# Patient Record
Sex: FEMALE | Race: BLACK OR AFRICAN AMERICAN | Employment: FULL TIME | RURAL
[De-identification: names, ages, dates, MRNs, and addresses within clinical notes are randomized per-mention and may not be internally consistent; named-entity substitution may affect disease eponyms.]

---

## 2017-09-12 ENCOUNTER — OFFICE VISIT (OUTPATIENT)
Dept: FAMILY MEDICINE CLINIC | Age: 32
End: 2017-09-12

## 2017-09-12 VITALS
WEIGHT: 199.2 LBS | RESPIRATION RATE: 20 BRPM | TEMPERATURE: 98.9 F | HEIGHT: 61 IN | BODY MASS INDEX: 37.61 KG/M2 | SYSTOLIC BLOOD PRESSURE: 148 MMHG | HEART RATE: 98 BPM | DIASTOLIC BLOOD PRESSURE: 104 MMHG | OXYGEN SATURATION: 99 %

## 2017-09-12 DIAGNOSIS — R53.83 FATIGUE, UNSPECIFIED TYPE: ICD-10-CM

## 2017-09-12 DIAGNOSIS — R00.2 HEART PALPITATIONS: ICD-10-CM

## 2017-09-12 DIAGNOSIS — G44.209 ACUTE NON INTRACTABLE TENSION-TYPE HEADACHE: Primary | ICD-10-CM

## 2017-09-12 RX ORDER — POLYETHYLENE GLYCOL 3350 17 G/17G
17 POWDER, FOR SOLUTION ORAL DAILY
Qty: 288 G | Refills: 0 | Status: SHIPPED | OUTPATIENT
Start: 2017-09-12 | End: 2018-01-01

## 2017-09-12 NOTE — PROGRESS NOTES
I reviewed with the resident the medical history and the resident's findings on the physical examination. I discussed with the resident the patient's diagnosis and concur with the plan. Repeat BP still elevated. If BP elevated on recheck in 3 days will start medication. EKG personally reviewed along with Dr. Inga Bledsoe, show sinus rhythm.

## 2017-09-12 NOTE — PROGRESS NOTES
[address] [facility],    Maribell Darling 303 with [facility] and [employer]            HPI     CC: I am having extreme headaches\"     Reyes Pimentel is a 27 y.o. female who presents     For the past 3 weeks, pt has had a throbbing frontal headache chest palpitations, along with hotflashes , fatigue, poor appetite, poor sleep. Pt has consistently been working for the past  28/30 days. Pt works as a  at [employer], looks at screens all day. Pt has some family stressors. She reports smoking more over the last month ( 3cigs to 10 cigs) after quitting marijuana 2 months after 20 years. .She states the headache is present when she wakes up ( rating at a level 4 )  and worsens as she gets closer to work ( rating at its peak 7/10) Today the pain is a 4/10. Pt has never had this happen before. Pain improves with Tylenol however, pt has only taken tylenol on 1-2 occasions due to not wanting to take medications    Yesterday, pt was seen at [facility] for headaches, at that time 120 bp and was told she had elevated blood pressure. Chest palpitations   Exercises 3-4 times a week. Constipation  Pt usually goes once every day. However,has bowel movements every  2-3 days. Patient's last menstrual period was 08/22/2017. This period last 3 days. Typical period last 5 days. PMHx:  History reviewed. No pertinent past medical history. Meds:   Current Outpatient Prescriptions   Medication Sig Dispense Refill    ketoconazole (NIZORAL) 2 % topical cream Apply  to affected area daily. 15 g 2       Allergies: Allergies   Allergen Reactions    Sulfur Itching       Smoker:  History   Smoking Status    Current Some Day Smoker   Smokeless Tobacco    Never Used       ETOH:   History   Alcohol Use No       FH:   History reviewed. No pertinent family history. ROS:  Review of Systems   Constitutional: Positive for fatigue.  Negative for activity change, appetite change, chills and fever. HENT: Negative for congestion, facial swelling, sinus pain, sinus pressure, sore throat and trouble swallowing. Respiratory: Negative for chest tightness and shortness of breath. Cardiovascular: Positive for palpitations. Negative for leg swelling. Gastrointestinal: Negative for abdominal pain, diarrhea, nausea and vomiting. Genitourinary: Positive for frequency. Negative for decreased urine volume, dysuria, flank pain and hematuria. Musculoskeletal: Negative for back pain and joint swelling. Neurological: Positive for headaches. Negative for dizziness, tremors, weakness, light-headedness and numbness. Psychiatric/Behavioral: Negative for confusion. Physical Exam:  Visit Vitals    BP (!) 141/101    Pulse 98    Temp 98.9 °F (37.2 °C) (Oral)    Resp 20    Ht 5' 1\" (1.549 m)    Wt 199 lb 3.2 oz (90.4 kg)    LMP 08/22/2017    SpO2 99%    BMI 37.64 kg/m2     PHQ9 :   Wt Readings from Last 3 Encounters:   09/12/17 199 lb 3.2 oz (90.4 kg)   06/17/15 190 lb 3.2 oz (86.3 kg)     BP Readings from Last 3 Encounters:   09/12/17 (!) 141/101   06/17/15 130/83        Physical Exam   Constitutional: She is oriented to person, place, and time. She appears well-developed and well-nourished. HENT:   Head: Atraumatic. Eyes: Conjunctivae and EOM are normal.   Neck: Neck supple. Cardiovascular: Normal rate, regular rhythm, normal heart sounds and intact distal pulses. Pulmonary/Chest: Effort normal and breath sounds normal. No respiratory distress. Abdominal: Bowel sounds are normal.   Musculoskeletal: Normal range of motion. She exhibits no edema. Neurological: She is alert and oriented to person, place, and time. No cranial nerve deficit. Skin: Skin is warm. No erythema. Psychiatric: She has a normal mood and affect. Assessment     27 y.o. female with history of tobacco abuse presents with:   There is no problem list on file for this patient. Today's diagnoses are:    ICD-10-CM ICD-9-CM    1. Acute non intractable tension-type headache G44.209 339.10 LIPID PANEL      METABOLIC PANEL, COMPREHENSIVE   2. Fatigue, unspecified type R53.83 780.79 TSH 3RD GENERATION      CBC W/O DIFF      HEMOGLOBIN A1C WITH EAG   3. Heart palpitations R00.2 785.1 REFERRAL TO CARDIOLOGY      AMB POC EKG ROUTINE W/ 12 LEADS, INTER & REP              Plan     1. Frontal Headaches without aura  Pt has photophobia, nausea symptoms are consistent with Migraines however it seems pt has underlying hypertension. Headaches improve with tylenol. She has associated fatigue, poor appetite,and constipation No neurological deficits on exam .   - Repeat blood pressure   - Encourage hydration > 8 cups daily   - Scheduled Tylenol every 6 hours   - Refer to Optometry     2. HTN in the setting in of persistent headaches, fatigue , poor appetite, worsening of  tobacco abuse. - Will recheck BP today   - CBC  - TSH   - CMP   - Lipid panel   - Hemoglobin A1C  - Referral to Cardiology for stress test   - EKG     3. Constipation   - Miralax  Daily   - Increase fiber in diet   - Increase water intake     Morbid Obesity   I have reviewed/discussed the above normal BMI with the patient. I have recommended the following interventions: dietary management education, guidance, and counseling, encourage exercise, monitor weight and prescribed dietary intake . Naregina Broccoli Tobacco Abuse   - Pt is working on quitting  The patient was counseled on the dangers of tobacco use, and was advised to quit. Reviewed strategies to maximize success, including removing cigarettes and smoking materials from environment, stress management, substitution of other forms of reinforcement and support of family/friends. 4. Discussed: as noted above     5. Follow up in 3 days for blood pressure check and symptoms. After not working for a couple of days. Prior labs and imaging were reviewed.        I have discussed the diagnosis with the patient and the intended plan as seen in the above orders. The patient has received an after-visit summary and questions were answered concerning future plans. I have discussed medication side effects and warnings with the patient as well. Patient was seen and discussed with Dr. Tere Gonzales, Attending Physician.     Eli Barrientos MD    [address] [place] [address]

## 2017-09-12 NOTE — PROGRESS NOTES
Health Maintenance Due   Topic Date Due    Pneumococcal 19-64 Medium Risk (1 of 1 - PPSV23) 01/25/2002    DTaP/Tdap/Td series (1 - Tdap) 01/25/2004    PAP AKA CERVICAL CYTOLOGY  01/25/2004    INFLUENZA AGE 9 TO ADULT  08/01/2017

## 2017-09-12 NOTE — MR AVS SNAPSHOT
Visit Information Date & Time Provider Department Dept. Phone Encounter #  
 9/12/2017  9:10 AM Ember Matos MD [address] [place], [place] [phone] [id number] Follow-up Instructions Return for bp check and reevaluation . Upcoming Health Maintenance Date Due Pneumococcal 19-64 Medium Risk (1 of 1 - PPSV23) 1/25/2002 DTaP/Tdap/Td series (1 - Tdap) 1/25/2004 PAP AKA CERVICAL CYTOLOGY 1/25/2004 INFLUENZA AGE 9 TO ADULT 8/1/2017 Allergies as of 9/12/2017  Review Complete On: 9/12/2017 By: Helio Reich Severity Noted Reaction Type Reactions Sulfur  06/17/2015    Itching Current Immunizations  Never Reviewed No immunizations on file. Not reviewed this visit You Were Diagnosed With   
  
 Codes Comments Acute non intractable tension-type headache    -  Primary ICD-10-CM: B95.740 ICD-9-CM: 339.10 Fatigue, unspecified type     ICD-10-CM: R53.83 ICD-9-CM: 780.79 Heart palpitations     ICD-10-CM: R00.2 ICD-9-CM: 785.1 Vitals BP Pulse Temp Resp Height(growth percentile) Weight(growth percentile) (!) 141/101 98 98.9 °F (37.2 °C) (Oral) 20 5' 1\" (1.549 m) 199 lb 3.2 oz (90.4 kg) LMP SpO2 BMI OB Status Smoking Status 08/22/2017 99% 37.64 kg/m2 Having regular periods Current Some Day Smoker Vitals History BMI and BSA Data Body Mass Index Body Surface Area  
 37.64 kg/m 2 1.97 m 2 Preferred Pharmacy Pharmacy Name Phone [facility] 79 [phone] Your Updated Medication List  
  
   
This list is accurate as of: 9/12/17 10:03 AM.  Always use your most recent med list.  
  
  
  
  
 ketoconazole 2 % topical cream  
Commonly known as:  Michele Mater Apply  to affected area daily. polyethylene glycol 17 gram/dose powder Commonly known as:  Luis Carlos Dao Take 17 g by mouth daily. Prescriptions Sent to Pharmacy Refills  
 polyethylene glycol (MIRALAX) 17 gram/dose powder 0 Sig: Take 17 g by mouth daily. Class: Normal  
 Pharmacy: [address] #: [phone] Route: Oral  
  
We Performed the Following AMB POC EKG ROUTINE W/ 12 LEADS, INTER & REP [75140 CPT(R)] CBC W/O DIFF [46395 CPT(R)] HEMOGLOBIN A1C WITH EAG [46242 CPT(R)] LIPID PANEL [53230 CPT(R)] METABOLIC PANEL, COMPREHENSIVE [03855 CPT(R)] REFERRAL TO CARDIOLOGY [DTU77 Custom] Comments:  
 Please evaluate patient for stress test.  
 REFERRAL TO OPTOMETRY [ZIR15 Custom] Comments:  
 Please evaluate patient for vision and acuity changes . T4, FREE [id number] CPT(R)] TSH 3RD GENERATION [56497 CPT(R)] Follow-up Instructions Return for bp check and reevaluation . Referral Information Referral ID Referred By Referred To  
  
 [id number] Chris Lyon Not Available Visits Status Start Date End Date 1 New Request 9/12/17 9/12/18 If your referral has a status of pending review or denied, additional information will be sent to support the outcome of this decision. Referral ID Referred By Referred To  
 [id number] Chris Lyon Not Available Visits Status Start Date End Date 1 New Request 9/12/17 9/12/18 If your referral has a status of pending review or denied, additional information will be sent to support the outcome of this decision. Introducing [facility]! Neha Cleveland introduces [employer] patient portal. Now you can access parts of your medical record, email your doctor's office, and request medication refills online. 1. In your internet browser, go to [email or web address] 2. Click on the First Time User? Click Here link in the Sign In box. You will see the New Member Sign Up page. 3. Enter your [employer] Access Code exactly as it appears below.  You will not need to use this code after youve completed the sign-up process. If you do not sign up before the expiration date, you must request a new code. · OnDeck Access Code: [email or web address] Expires: 12/11/2017  9:24 AM 
 
4. Enter the last four digits of your Social Security Number (xxxx) and Date of Birth (mm/dd/yyyy) as indicated and click Submit. You will be taken to the next sign-up page. 5. Create a OnDeck ID. This will be your OnDeck login ID and cannot be changed, so think of one that is secure and easy to remember. 6. Create a OnDeck password. You can change your password at any time. 7. Enter your Password Reset Question and Answer. This can be used at a later time if you forget your password. 8. Enter your e-mail address. You will receive e-mail notification when new information is available in [address] 9. Click Sign Up. You can now view and download portions of your medical record. 10. Click the Download Summary menu link to download a portable copy of your medical information. If you have questions, please visit the Frequently Asked Questions section of the OnDeck website. Remember, OnDeck is NOT to be used for urgent needs. For medical emergencies, dial 911. Now available from your iPhone and Android! Please provide this summary of care documentation to your next provider. Your primary care clinician is listed as Amanda Flood. If you have any questions after today's visit, please call [phone].

## 2017-09-12 NOTE — LETTER
NOTIFICATION RETURN TO WORK / SCHOOL 
 
9/12/2017 10:08 AM 
 
Ms. Inge Wu [address] [address] To Whom It May Concern: 
 
Kiesha Thomas is currently under the care of Deana Treadwell. She will return to work on: Monday September 18, 2017 If there are questions or concerns please have the patient contact our office.  
 
 
 
Sincerely, 
 
 
Dustin Wade MD

## 2017-09-13 LAB
ALBUMIN SERPL-MCNC: 4 G/DL (ref 3.5–5.5)
ALBUMIN/GLOB SERPL: 1.4 {RATIO} (ref 1.2–2.2)
ALP SERPL-CCNC: 70 IU/L (ref 39–117)
ALT SERPL-CCNC: 14 IU/L (ref 0–32)
AST SERPL-CCNC: 12 IU/L (ref 0–40)
BILIRUB SERPL-MCNC: 0.2 MG/DL (ref 0–1.2)
BUN SERPL-MCNC: 9 MG/DL (ref 6–20)
BUN/CREAT SERPL: 11 (ref 9–23)
CALCIUM SERPL-MCNC: 8.9 MG/DL (ref 8.7–10.2)
CHLORIDE SERPL-SCNC: 105 MMOL/L (ref 96–106)
CHOLEST SERPL-MCNC: 117 MG/DL (ref 100–199)
CO2 SERPL-SCNC: 22 MMOL/L (ref 18–29)
CREAT SERPL-MCNC: 0.83 MG/DL (ref 0.57–1)
ERYTHROCYTE [DISTWIDTH] IN BLOOD BY AUTOMATED COUNT: 17.2 % (ref 12.3–15.4)
EST. AVERAGE GLUCOSE BLD GHB EST-MCNC: 108 MG/DL
GLOBULIN SER CALC-MCNC: 2.9 G/DL (ref 1.5–4.5)
GLUCOSE SERPL-MCNC: 87 MG/DL (ref 65–99)
HBA1C MFR BLD: 5.4 % (ref 4.8–5.6)
HCT VFR BLD AUTO: 39.8 % (ref 34–46.6)
HDLC SERPL-MCNC: 37 MG/DL
HGB BLD-MCNC: 12.8 G/DL (ref 11.1–15.9)
LDLC SERPL CALC-MCNC: 60 MG/DL (ref 0–99)
MCH RBC QN AUTO: 22.9 PG (ref 26.6–33)
MCHC RBC AUTO-ENTMCNC: 32.2 G/DL (ref 31.5–35.7)
MCV RBC AUTO: 71 FL (ref 79–97)
PLATELET # BLD AUTO: 334 X10E3/UL (ref 150–379)
POTASSIUM SERPL-SCNC: 4.9 MMOL/L (ref 3.5–5.2)
PROT SERPL-MCNC: 6.9 G/DL (ref 6–8.5)
RBC # BLD AUTO: 5.6 X10E6/UL (ref 3.77–5.28)
SODIUM SERPL-SCNC: 140 MMOL/L (ref 134–144)
T4 FREE SERPL-MCNC: 0.95 NG/DL (ref 0.82–1.77)
TRIGL SERPL-MCNC: 101 MG/DL (ref 0–149)
TSH SERPL DL<=0.005 MIU/L-ACNC: 1.29 UIU/ML (ref 0.45–4.5)
VLDLC SERPL CALC-MCNC: 20 MG/DL (ref 5–40)
WBC # BLD AUTO: 7.8 X10E3/UL (ref 3.4–10.8)

## 2017-09-14 ENCOUNTER — OFFICE VISIT (OUTPATIENT)
Dept: FAMILY MEDICINE CLINIC | Age: 32
End: 2017-09-14

## 2017-09-14 VITALS
RESPIRATION RATE: 20 BRPM | SYSTOLIC BLOOD PRESSURE: 124 MMHG | OXYGEN SATURATION: 97 % | TEMPERATURE: 98.6 F | HEIGHT: 61 IN | DIASTOLIC BLOOD PRESSURE: 93 MMHG | BODY MASS INDEX: 36.93 KG/M2 | WEIGHT: 195.6 LBS | HEART RATE: 97 BPM

## 2017-09-14 DIAGNOSIS — I10 ESSENTIAL HYPERTENSION: Primary | ICD-10-CM

## 2017-09-14 RX ORDER — METOPROLOL SUCCINATE 25 MG/1
50 TABLET, EXTENDED RELEASE ORAL DAILY
Qty: 30 TAB | Refills: 1 | Status: SHIPPED | OUTPATIENT
Start: 2017-09-14 | End: 2018-01-01

## 2017-09-14 RX ORDER — HYDROCHLOROTHIAZIDE 12.5 MG/1
12.5 TABLET ORAL DAILY
Qty: 30 TAB | Refills: 1 | Status: SHIPPED | OUTPATIENT
Start: 2017-09-14 | End: 2017-09-14

## 2017-09-14 NOTE — PROGRESS NOTES
[address] [facility],    Vishalmonserrat Lisset 303 with [place] and Nichol Poole            [facility] [place]     CC: \" I am feeling better\"    Chikis Pelaez is a 27 y.o. female who presents blood pressure check     HTN   Pt reports since last visit 2 days ago, she has changed eating habits. Found another job with less stress. Pt works out at baseline. Pt has gotten more rest. Pt still smokes at least 3-4 cigs a day, but denies smoking marijuana, illicit drug use or tobacco use. Denies headaches, vision changes,fevers, chills, nausea, vomiting , chest pain, dyspnea abdominal pain,constipation, melena, hematochezia, lower extremity swelling. PMHx:  History reviewed. No pertinent past medical history. Meds:   Current Outpatient Prescriptions   Medication Sig Dispense Refill    metoprolol succinate (TOPROL-XL) 25 mg XL tablet Take 2 Tabs by mouth daily. 30 Tab 1    polyethylene glycol (MIRALAX) 17 gram/dose powder Take 17 g by mouth daily. 288 g 0    ketoconazole (NIZORAL) 2 % topical cream Apply  to affected area daily. 15 g 2       Allergies: Allergies   Allergen Reactions    Sulfur Itching       Smoker:  History   Smoking Status    Current Some Day Smoker   Smokeless Tobacco    Never Used       ETOH:   History   Alcohol Use No       FH:   History reviewed. No pertinent family history. ROS:  Review of Systems   Constitutional: Negative for activity change, appetite change, chills, fatigue and fever. Respiratory: Negative for chest tightness and shortness of breath. Cardiovascular: Negative for chest pain, palpitations and leg swelling. Gastrointestinal: Negative for abdominal pain, diarrhea, nausea and vomiting. Genitourinary: Negative for dysuria, flank pain and hematuria. Musculoskeletal: Negative for back pain and joint swelling. Neurological: Negative for dizziness, weakness and numbness. Psychiatric/Behavioral: Negative for confusion.        Physical Exam:  Visit Vitals    BP (!) 124/93 (BP 1 Location: Right arm, BP Patient Position: Sitting)    Pulse 97    Temp 98.6 °F (37 °C) (Oral)    Resp 20    Ht 5' 1\" (1.549 m)    Wt 195 lb 9.6 oz (88.7 kg)    LMP 08/22/2017    SpO2 97%    BMI 36.96 kg/m2       Wt Readings from Last 3 Encounters:   09/14/17 195 lb 9.6 oz (88.7 kg)   09/12/17 199 lb 3.2 oz (90.4 kg)   06/17/15 190 lb 3.2 oz (86.3 kg)     BP Readings from Last 3 Encounters:   09/14/17 (!) 124/93   09/12/17 (!) 148/104   06/17/15 130/83        Physical Exam   Constitutional: She is oriented to person, place, and time. She appears well-developed and well-nourished. HENT:   Head: Atraumatic. Eyes: Conjunctivae and EOM are normal.   Neck: Neck supple. Cardiovascular: Normal rate, regular rhythm, normal heart sounds and intact distal pulses. Pulmonary/Chest: Effort normal and breath sounds normal. No respiratory distress. Abdominal: Bowel sounds are normal.   Musculoskeletal: Normal range of motion. She exhibits no edema. Neurological: She is alert and oriented to person, place, and time. No cranial nerve deficit. Skin: Skin is warm. No erythema. Psychiatric: She has a normal mood and affect. Assessment     27 y.o. female presents with history of: There is no problem list on file for this patient. Today's diagnoses are:    ICD-10-CM ICD-9-CM    1. Essential hypertension I10 401.9 metoprolol succinate (TOPROL-XL) 25 mg XL tablet      DISCONTINUED: hydroCHLOROthiazide (HYDRODIURIL) 12.5 mg tablet              Plan     1. HTN   Lipid panel wnl on 9/12/17,TSH 1.290, T4 0.95. Pt is tacycardic 110 in the office  - Will start Metoprolol 50mg   - previously drawn labs were discussed    2. Obesity   . I have reviewed/discussed the above normal BMI with the patient.   I have recommended the following interventions: dietary management education, guidance, and counseling, encourage exercise, monitor weight and prescribed dietary intake . Hugo Andres Follow-up Disposition:  Return in about 4 weeks (around 10/12/2017) for routine follow up ,bp check . Prior labs and imaging were reviewed. I have discussed the diagnosis with the patient and the intended plan as seen in the above orders. The patient has received an after-visit summary and questions were answered concerning future plans. I have discussed medication side effects and warnings with the patient as well. Patient discussed with Dr. Ana Vaughn , Attending Physician.     Amanda Flood MD    [address] [place] [address]

## 2017-09-14 NOTE — MR AVS SNAPSHOT
Visit Information Date & Time Provider Department Dept. Phone Encounter #  
 9/14/2017  3:10 PM Yvette Campo  [facility] [phone] [id number] Follow-up Instructions Return in about 4 weeks (around 10/12/2017) for routine follow up ,bp check . Your Appointments 10/11/2017  3:00 PM  
New Patient with May Pardo MD  
[facility] ([facility]) Appt Note: palpitations [address]  
  
   
 [address] [place] [address] Upcoming Health Maintenance Date Due Pneumococcal 19-64 Medium Risk (1 of 1 - PPSV23) 1/25/2002 DTaP/Tdap/Td series (1 - Tdap) 1/25/2004 PAP AKA CERVICAL CYTOLOGY 1/25/2004 INFLUENZA AGE 9 TO ADULT 8/1/2017 Allergies as of 9/14/2017  Review Complete On: 9/14/2017 By: Marty Villagomez Severity Noted Reaction Type Reactions Sulfur  06/17/2015    Itching Current Immunizations  Never Reviewed No immunizations on file. Not reviewed this visit You Were Diagnosed With   
  
 Codes Comments Essential hypertension    -  Primary ICD-10-CM: I10 
ICD-9-CM: 401.9 Vitals BP Pulse Temp Resp Height(growth percentile) Weight(growth percentile) (!) 124/93 (BP 1 Location: Right arm, BP Patient Position: Sitting) 97 98.6 °F (37 °C) (Oral) 20 5' 1\" (1.549 m) 195 lb 9.6 oz (88.7 kg) LMP SpO2 BMI OB Status Smoking Status 08/22/2017 97% 36.96 kg/m2 Having regular periods Current Some Day Smoker Vitals History BMI and BSA Data Body Mass Index Body Surface Area  
 36.96 kg/m 2 1.95 m 2 Preferred Pharmacy Pharmacy Name Phone [facility] [address] [phone] Your Updated Medication List  
  
   
This list is accurate as of: 9/14/17  3:38 PM.  Always use your most recent med list.  
  
  
  
  
 ketoconazole 2 % topical cream  
Commonly known as:  NIZORAL Apply  to affected area daily. metoprolol succinate 25 mg XL tablet Commonly known as:  TOPROL-XL Take 2 Tabs by mouth daily. polyethylene glycol 17 gram/dose powder Commonly known as:  Jelani Anderson Take 17 g by mouth daily. Prescriptions Sent to Pharmacy Refills  
 metoprolol succinate (TOPROL-XL) 25 mg XL tablet 1 Sig: Take 2 Tabs by mouth daily. Class: Normal  
 Pharmacy: [address], [address] [place] [address] #: [phone] Route: Oral  
  
Follow-up Instructions Return in about 4 weeks (around 10/12/2017) for routine follow up ,bp check . Introducing [facility]! Rosa M Salinas introduces [employer] patient portal. Now you can access parts of your medical record, email your doctor's office, and request medication refills online. 1. In your internet browser, go to [email or web address] 2. Click on the First Time User? Click Here link in the Sign In box. You will see the New Member Sign Up page. 3. Enter your [employer] Access Code exactly as it appears below. You will not need to use this code after youve completed the sign-up process. If you do not sign up before the expiration date, you must request a new code. · [employer] Access Code: [email or web address] Expires: 12/11/2017  9:24 AM 
 
4. Enter the last four digits of your Social Security Number (xxxx) and Date of Birth (mm/dd/yyyy) as indicated and click Submit. You will be taken to the next sign-up page. 5. Create a [employer] ID. This will be your [employer] login ID and cannot be changed, so think of one that is secure and easy to remember. 6. Create a [employer] password. You can change your password at any time. 7. Enter your Password Reset Question and Answer. This can be used at a later time if you forget your password. 8. Enter your e-mail address. You will receive e-mail notification when new information is available in [address] 9. Click Sign Up. You can now view and download portions of your medical record. 10. Click the Download Summary menu link to download a portable copy of your medical information. If you have questions, please visit the Frequently Asked Questions section of the [employer] website. Remember, [employer] is NOT to be used for urgent needs. For medical emergencies, dial 911. Now available from your iPhone and Android! Please provide this summary of care documentation to your next provider. Your primary care clinician is listed as Eli Barrientos. If you have any questions after today's visit, please call [phone].

## 2017-09-19 NOTE — PROGRESS NOTES
I discussed the findings, assessment and plan in detail with the resident and agree with the resident's findings and plan as documented in the resident's note. Shanelle Huynh M.D.

## 2017-10-11 ENCOUNTER — OFFICE VISIT (OUTPATIENT)
Dept: CARDIOLOGY CLINIC | Age: 32
End: 2017-10-11

## 2017-10-11 VITALS
SYSTOLIC BLOOD PRESSURE: 132 MMHG | DIASTOLIC BLOOD PRESSURE: 88 MMHG | RESPIRATION RATE: 16 BRPM | OXYGEN SATURATION: 97 % | HEART RATE: 88 BPM | HEIGHT: 61 IN | BODY MASS INDEX: 37 KG/M2 | TEMPERATURE: 98.3 F | WEIGHT: 196 LBS

## 2017-10-11 DIAGNOSIS — I10 ESSENTIAL HYPERTENSION: ICD-10-CM

## 2017-10-11 DIAGNOSIS — R00.0 TACHYCARDIA: Primary | ICD-10-CM

## 2017-10-11 NOTE — PATIENT INSTRUCTIONS

## 2017-10-11 NOTE — LETTER
NOTIFICATION RETURN TO WORK  
 
10/11/2017 3:28 PM 
 
Ms. Anmol De La Cruz 
[address] [facility] [address] To Whom It May Concern: 
 
Anmol De La Cruz is currently under the care of [address] She will return to work on: 10-12-17 If there are questions or concerns please have the patient contact our office. Sincerely, Lani Huerta LPN

## 2017-10-11 NOTE — PROGRESS NOTES
Celeste Elliott      1985   Mary Jo Jones MD  Date of Visit-10/11/2017     [facility],  PCP=Megan Gautam MD     [facility] [address] [place] [address] [facility] [place]. HPI:   Nella Ward is a 27 y.o. female    Ms. Nichole Jordan is referred for tachycardia. She's had a little bit of elevated blood pressure. She admits to eating a fair amount of fast food. She works as a  at a [employer] and feels she has been busy and stressed out. Her blood pressure is elevated in the high 140s. Dr. Julia Gautam started a beta blocker and since she has felt better and symptoms of her fatigue and shortness of breath have improved. She's had no chest pain, chest pressure. She had a heart rate of 110 and EKG was done on previous visit that showed normal sinus rhythm. Assessment/Plans:  1. Tachycardia    2. Essential hypertension         1. Tachycardia and blood pressure have resolved with the use of beta blocker. This was not an arrhythmia, but rather a sinus tachycardia that was seen on the EKG, likely related to the elevated blood pressure, and I've encouraged her to decrease her salt, avoid fast foods, continue current medications. No further cardiac testing is required. Prior SH/FH/PMH/ROS/MEDS were reviewed in the chart. No future appointments. Cardiac History:   No specialty comments available. ROS:Cardiac complete  as above. Respiratory as above with no wheezing or hemoptysis. She denies  symptoms of unusual weight loss , fevers,  BRBPR, hematuria,  or recent stroke    History reviewed. No pertinent past medical history.    Exam and Labs:  Visit Vitals    /88 (BP 1 Location: Right arm, BP Patient Position: Sitting)    Pulse 88    Temp 98.3 °F (36.8 °C) (Oral)    Resp 16    Ht 5' 1\" (1.549 m)    Wt 196 lb (88.9 kg)    LMP 08/22/2017    SpO2 97%    BMI 37.03 kg/m2     Constitutional:  NAD, comfortable , moist mucous membranesHENT: Head: NC,ATEyes: No scleral icterus. Neck:  Neck supple. No JVD present. No tracheal deviation,mass  Chest: Effort normal & normal respiratory excursion     Lungs:breath sounds normal. No stridor. distress, wheezes or  Rales. Heart:normal rate, regular rhythm, normal S1, S2, no murmurs, rubs, clicks or gallops , PMI non displaced. Edema: Edema is none. Extremities:  no clubbing or cyanosis. Abdominal:  no abnormal distension. Neurological: alert, conversant and oriented . Skin: Skin is not cold. No obvious systemic rash noted. Not diaphoretic. No erythema. Psychiatric:  Grossly normal mood and affect. Behavior appears normal.     Lab Results   Component Value Date/Time    Cholesterol, total 117 09/12/2017 10:33 AM    HDL Cholesterol 37 09/12/2017 10:33 AM    LDL, calculated 60 09/12/2017 10:33 AM    Triglyceride 101 09/12/2017 10:33 AM     Lab Results   Component Value Date/Time    Sodium 140 09/12/2017 10:33 AM    Potassium 4.9 09/12/2017 10:33 AM    Chloride 105 09/12/2017 10:33 AM    CO2 22 09/12/2017 10:33 AM    Glucose 87 09/12/2017 10:33 AM    BUN 9 09/12/2017 10:33 AM    Creatinine 0.83 09/12/2017 10:33 AM    BUN/Creatinine ratio 11 09/12/2017 10:33 AM    GFR est  09/12/2017 10:33 AM    GFR est non-AA 92 09/12/2017 10:33 AM    Calcium 8.9 09/12/2017 10:33 AM      Wt Readings from Last 3 Encounters:   10/11/17 196 lb (88.9 kg)   09/14/17 195 lb 9.6 oz (88.7 kg)   09/12/17 199 lb 3.2 oz (90.4 kg)      BP Readings from Last 3 Encounters:   10/11/17 132/88   09/14/17 (!) 124/93   09/12/17 (!) 148/104        Current Outpatient Prescriptions   Medication Sig    metoprolol succinate (TOPROL-XL) 25 mg XL tablet Take 2 Tabs by mouth daily.  polyethylene glycol (MIRALAX) 17 gram/dose powder Take 17 g by mouth daily.  ketoconazole (NIZORAL) 2 % topical cream Apply  to affected area daily. No current facility-administered medications for this visit.        Past Surgical History: Procedure Laterality Date    HX TONSIL AND ADENOIDECTOMY        Social Hx=  reports that she has been smoking. She has never used smokeless tobacco. She reports that she does not drink alcohol. Family Hx= family history is not on file. Impression see above.

## 2017-10-11 NOTE — PROGRESS NOTES
1. Have you been to the ER, urgent care clinic since your last visit? Hospitalized since your last visit? No    2. Have you seen or consulted any other health care providers outside of the [address] [place] [address] since your last visit? Include any pap smears or colon screening.  No  Reviewed record in preparation for visit and have necessary documentation  Pt did not bring medication to office visit for review    Goals that were addressed and/or need to be completed during or after this appointment include   Health Maintenance Due   Topic Date Due    Pneumococcal 19-64 Medium Risk (1 of 1 - PPSV23) 01/25/2002    DTaP/Tdap/Td series (1 - Tdap) 01/25/2004    PAP AKA CERVICAL CYTOLOGY  01/25/2004    INFLUENZA AGE 9 TO ADULT  08/01/2017

## 2018-01-01 ENCOUNTER — OFFICE VISIT (OUTPATIENT)
Dept: FAMILY MEDICINE CLINIC | Age: 33
End: 2018-01-01

## 2018-01-01 VITALS
HEIGHT: 61 IN | TEMPERATURE: 98.1 F | OXYGEN SATURATION: 98 % | HEART RATE: 99 BPM | BODY MASS INDEX: 34.93 KG/M2 | SYSTOLIC BLOOD PRESSURE: 132 MMHG | DIASTOLIC BLOOD PRESSURE: 88 MMHG | WEIGHT: 185 LBS | RESPIRATION RATE: 20 BRPM

## 2018-01-01 VITALS
OXYGEN SATURATION: 97 % | WEIGHT: 163 LBS | HEART RATE: 102 BPM | DIASTOLIC BLOOD PRESSURE: 98 MMHG | BODY MASS INDEX: 30.78 KG/M2 | TEMPERATURE: 98.4 F | RESPIRATION RATE: 16 BRPM | SYSTOLIC BLOOD PRESSURE: 142 MMHG | HEIGHT: 61 IN

## 2018-01-01 DIAGNOSIS — S46.912A SHOULDER STRAIN, LEFT, INITIAL ENCOUNTER: Primary | ICD-10-CM

## 2018-01-01 DIAGNOSIS — R03.0 ELEVATED BLOOD PRESSURE READING: ICD-10-CM

## 2018-01-01 DIAGNOSIS — B35.4 TINEA CORPORIS: ICD-10-CM

## 2018-01-01 DIAGNOSIS — R21 RASH: Primary | ICD-10-CM

## 2018-01-01 RX ORDER — CYCLOBENZAPRINE HCL 10 MG
5 TABLET ORAL
Qty: 30 TAB | Refills: 1 | Status: SHIPPED | OUTPATIENT
Start: 2018-01-01 | End: 2019-01-01

## 2018-01-01 RX ORDER — TRIAMCINOLONE ACETONIDE 5 MG/G
OINTMENT TOPICAL 2 TIMES DAILY
Qty: 30 G | Refills: 0 | Status: SHIPPED | OUTPATIENT
Start: 2018-01-01 | End: 2018-01-01

## 2018-01-01 RX ORDER — KETOCONAZOLE 20 MG/G
CREAM TOPICAL
Qty: 60 G | Refills: 2 | Status: SHIPPED | OUTPATIENT
Start: 2018-01-01 | End: 2018-01-01

## 2018-07-31 NOTE — PATIENT INSTRUCTIONS

## 2018-07-31 NOTE — PROGRESS NOTES
1. Have you been to the ER, urgent care clinic since your last visit? Hospitalized since your last visit? no 
 
2. Have you seen or consulted any other health care providers outside of the [address] [place] [address] since your last visit? Include any pap smears or colon screening. no 
Reviewed record in preparation for visit and have obtained necessary documentation. Patient did not bring medications to visit for review. Information provided on Advanced Directive, Living Will. Body mass index is 34.96 kg/(m^2). Health Maintenance Due Topic Date Due  Pneumococcal 19-64 Medium Risk (1 of 1 - PPSV23) 01/25/2002  DTaP/Tdap/Td series (1 - Tdap) 01/25/2004  PAP AKA CERVICAL CYTOLOGY  01/25/2004

## 2018-07-31 NOTE — MR AVS SNAPSHOT
[address] [address] 
[phone] Patient: Humphrey Ormond 
MRN: [id number] Visit Information Date & Time Provider Department Dept. Phone Encounter #  
 7/31/2018  9:50 AM Lindalee Angelucci, MD  [facility] [id number] Upcoming Health Maintenance Date Due Pneumococcal 19-64 Medium Risk (1 of 1 - PPSV23) 1/25/2002 DTaP/Tdap/Td series (1 - Tdap) 1/25/2004 PAP AKA CERVICAL CYTOLOGY 1/25/2004 Influenza Age 5 to Adult 8/1/2018 Allergies as of 7/31/2018  Review Complete On: 7/31/2018 By: Ofelia Wells LPN Severity Noted Reaction Type Reactions Sulfur  06/17/2015    Itching Current Immunizations  Never Reviewed No immunizations on file. Not reviewed this visit You Were Diagnosed With   
  
 Codes Comments Rash    -  Primary ICD-10-CM: R21 
ICD-9-CM: 782.1 Tinea corporis     ICD-10-CM: B35.4 ICD-9-CM: 110.5 Vitals BP Pulse Temp Resp Height(growth percentile) Weight(growth percentile) 132/88 (BP 1 Location: Left arm, BP Patient Position: Sitting) 99 98.1 °F (36.7 °C) (Oral) 20 5' 1\" (1.549 m) 185 lb (83.9 kg) SpO2 BMI OB Status Smoking Status 98% 34.96 kg/m2 Having regular periods Current Some Day Smoker Vitals History BMI and BSA Data Body Mass Index Body Surface Area 34.96 kg/m 2 1.9 m 2 Preferred Pharmacy Pharmacy Name Phone [facility] [address] [place] [address] [phone] Your Updated Medication List  
  
   
This list is accurate as of 7/31/18 10:44 AM.  Always use your most recent med list.  
  
  
  
  
 ketoconazole 2 % topical cream  
Commonly known as:  Lisa Fallen Apply to right arm twice daily for 14 days  
  
 metoprolol succinate 25 mg XL tablet Commonly known as:  TOPROL-XL Take 2 Tabs by mouth daily. polyethylene glycol 17 gram/dose powder Commonly known as:  Lemalvin Gutierrez Take 17 g by mouth daily. triamcinolone acetonide 0.5 % ointment Commonly known as:  KENALOG Apply  to affected area two (2) times a day. use thin layer Prescriptions Sent to Pharmacy Refills  
 triamcinolone acetonide (KENALOG) 0.5 % ointment 0 Sig: Apply  to affected area two (2) times a day. use thin layer Class: Normal  
 Pharmacy: [facility] [address] Ph #: [phone] Route: Topical  
 ketoconazole (NIZORAL) 2 % topical cream 2 Sig: Apply to right arm twice daily for 14 days Class: Normal  
 Pharmacy: [address] Ph #: [phone] Patient Instructions A Healthy Lifestyle: Care Instructions Your Care Instructions A healthy lifestyle can help you feel good, stay at a healthy weight, and have plenty of energy for both work and play. A healthy lifestyle is something you can share with your whole family. A healthy lifestyle also can lower your risk for serious health problems, such as high blood pressure, heart disease, and diabetes. You can follow a few steps listed below to improve your health and the health of your family. Follow-up care is a key part of your treatment and safety. Be sure to make and go to all appointments, and call your doctor if you are having problems. It's also a good idea to know your test results and keep a list of the medicines you take. How can you care for yourself at home? · Do not eat too much sugar, fat, or fast foods. You can still have dessert and treats now and then. The goal is moderation. · Start small to improve your eating habits. Pay attention to portion sizes, drink less juice and soda pop, and eat more fruits and vegetables. ¨ Eat a healthy amount of food.  A 3-ounce serving of meat, for example, is about the size of a deck of cards. Fill the rest of your plate with vegetables and whole grains. ¨ Limit the amount of soda and sports drinks you have every day. Drink more water when you are thirsty. ¨ Eat at least 5 servings of fruits and vegetables every day. It may seem like a lot, but it is not hard to reach this goal. A serving or helping is 1 piece of fruit, 1 cup of vegetables, or 2 cups of leafy, raw vegetables. Have an apple or some carrot sticks as an afternoon snack instead of a candy bar. Try to have fruits and/or vegetables at every meal. 
· Make exercise part of your daily routine. You may want to start with simple activities, such as walking, bicycling, or slow swimming. Try to be active 30 to 60 minutes every day. You do not need to do all 30 to 60 minutes all at once. For example, you can exercise 3 times a day for 10 or 20 minutes. Moderate exercise is safe for most people, but it is always a good idea to talk to your doctor before starting an exercise program. 
· Keep moving. Yamileth Folk the lawn, work in the garden, or [employer]. Take the stairs instead of the elevator at work. · If you smoke, quit. People who smoke have an increased risk for heart attack, stroke, cancer, and other lung illnesses. Quitting is hard, but there are ways to boost your chance of quitting tobacco for good. ¨ Use nicotine gum, patches, or lozenges. ¨ Ask your doctor about stop-smoking programs and medicines. ¨ Keep trying. In addition to reducing your risk of diseases in the future, you will notice some benefits soon after you stop using tobacco. If you have shortness of breath or asthma symptoms, they will likely get better within a few weeks after you quit. · Limit how much alcohol you drink. Moderate amounts of alcohol (up to 2 drinks a day for men, 1 drink a day for women) are okay. But drinking too much can lead to liver problems, high blood pressure, and other health problems. Family health If you have a family, there are many things you can do together to improve your health. · Eat meals together as a family as often as possible. · Eat healthy foods. This includes fruits, vegetables, lean meats and dairy, and whole grains. · Include your family in your fitness plan. Most people think of activities such as jogging or tennis as the way to fitness, but there are many ways you and your family can be more active. Anything that makes you breathe hard and gets your heart pumping is exercise. Here are some tips: 
¨ Walk to do errands or to take your child to school or the bus. ¨ Go for a family bike ride after dinner instead of watching TV. Where can you learn more? Go to http://elodia-shannen.info/. Enter H667 in the search box to learn more about \"A Healthy Lifestyle: Care Instructions. \" Current as of: December 7, 2017 Content Version: 11.7 © 0230-7048 [employer]. Care instructions adapted under license by [employer] (which disclaims liability or warranty for this information). If you have questions about a medical condition or this instruction, always ask your healthcare professional. [place] [address] any warranty or liability for your use of this information. Dont forget to buy - Antihistamines - Zantac - Zyrtec/claritin - Benadryl (at night) Introducing [facility]! [employer] introduces [employer]ia patient portal. Now you can access parts of your medical record, email your doctor's office, and request medication refills online. 1. In your internet browser, go to [email or web address] [email or web address] 2. Click on the First Time User? Click Here link in the Sign In box. You will see the New Member Sign Up page. 3. Enter your Abaxia Access Code exactly as it appears below. You will not need to use this code after youve completed the sign-up process.  If you do not sign up before the expiration date, you must request a new code. · [email or web address] Access Code: Elizabeth Esquivel Expires: 10/29/2018 10:44 AM 
 
4. Enter the last four digits of your Social Security Number (xxxx) and Date of Birth (mm/dd/yyyy) as indicated and click Submit. You will be taken to the next sign-up page. 5. Create a [email or web address] ID. This will be your [email or web address] login ID and cannot be changed, so think of one that is secure and easy to remember. 6. Create a [email or web address] password. You can change your password at any time. 7. Enter your Password Reset Question and Answer. This can be used at a later time if you forget your password. 8. Enter your e-mail address. You will receive e-mail notification when new information is available in [address] 9. Click Sign Up. You can now view and download portions of your medical record. 10. Click the Download Summary menu link to download a portable copy of your medical information. If you have questions, please visit the Frequently Asked Questions section of the [email or web address] website. Remember, [email or web address] is NOT to be used for urgent needs. For medical emergencies, dial 911. Now available from your iPhone and Android! Please provide this summary of care documentation to your next provider. Your primary care clinician is listed as Olivia Leiva. If you have any questions after today's visit, please call [phone].

## 2018-07-31 NOTE — PROGRESS NOTES
[address] [address] [facility], [address] [place] [address] Affiliated with [address] [place] [address] 
  
CC: \" Rash \"  
 
Ivania Rhodes is a 26 y.o. female who presents for worsening of left rash Pt reports for the past 2 months , she has noticed worsening of an itchy rash found on left elbow. She reports trying hydrocortisone cream consistently for a few weeks with some improvement,but stop using the cream and the rash progressed. Pt denies having rash like this before. Reports allergies to only sulfur containing substances. Denies symptoms of wheezes,throat swelling, lip swelling, difficulty breathing,recent bug bites or exposure to wooded areas/plants. Denies recent weight loss or night sweats. PMHx: 
History reviewed. No pertinent past medical history. Meds:  
Current Outpatient Prescriptions Medication Sig Dispense Refill  triamcinolone acetonide (KENALOG) 0.5 % ointment Apply  to affected area two (2) times a day. use thin layer 30 g 0  
 ketoconazole (NIZORAL) 2 % topical cream Apply to right arm twice daily for 14 days 60 g 2  
 metoprolol succinate (TOPROL-XL) 25 mg XL tablet Take 2 Tabs by mouth daily. 30 Tab 1  polyethylene glycol (MIRALAX) 17 gram/dose powder Take 17 g by mouth daily. 288 g 0 Allergies: Allergies Allergen Reactions  Sulfur Itching Smoker: 
History Smoking Status  Current Some Day Smoker Smokeless Tobacco  
 Never Used ETOH:  
History Alcohol Use No  
 
 
FH:  
History reviewed. No pertinent family history. ROS: 
Review of Systems Constitutional: Negative for activity change, appetite change, chills, fatigue and fever. Respiratory: Negative for chest tightness and shortness of breath. Cardiovascular: Negative for chest pain, palpitations and leg swelling. Gastrointestinal: Negative for abdominal pain, diarrhea, nausea and vomiting.   
Genitourinary: Negative for dysuria, flank pain and hematuria. Musculoskeletal: Negative for back pain and joint swelling. Skin: Positive for rash. Allergic/Immunologic: Negative for environmental allergies. Neurological: Negative for dizziness, weakness and numbness. Psychiatric/Behavioral: Negative for confusion. Physical Exam: 
Visit Vitals  /88 (BP 1 Location: Left arm, BP Patient Position: Sitting)  Pulse 99  Temp 98.1 °F (36.7 °C) (Oral)  Resp 20  
 Ht 5' 1\" (1.549 m)  Wt 185 lb (83.9 kg)  SpO2 98%  BMI 34.96 kg/m2 Wt Readings from Last 3 Encounters:  
07/31/18 185 lb (83.9 kg) 10/11/17 196 lb (88.9 kg) 09/14/17 195 lb 9.6 oz (88.7 kg) BP Readings from Last 3 Encounters:  
07/31/18 132/88  
10/11/17 132/88  
09/14/17 (!) 124/93 Physical Exam  
Constitutional: She is oriented to person, place, and time. She appears well-developed and well-nourished. HENT:  
Head: Atraumatic. Eyes: Conjunctivae and EOM are normal.  
Neck: Neck supple. Cardiovascular: Normal rate, regular rhythm, normal heart sounds and intact distal pulses. Pulmonary/Chest: Effort normal and breath sounds normal. No respiratory distress. Musculoskeletal: She exhibits no edema. Neurological: She is alert and oriented to person, place, and time. No cranial nerve deficit. Skin: Rash noted. No erythema. Excoriated patch without fluctuance, maculopapular rash Psychiatric: She has a normal mood and affect. Nursing note and vitals reviewed. Assessment 26 y.o. female presents with history of: There is no problem list on file for this patient. Today's diagnoses are: ICD-10-CM ICD-9-CM 1. Rash R21 782.1 triamcinolone acetonide (KENALOG) 0.5 % ointment 2. Tinea corporis B35.4 110.5 ketoconazole (NIZORAL) 2 % topical cream  
  
 
  
   Plan ·  Atopic dermatitis: rash previously responsive to steroids -  Kenalog - Pepcid/Claritin prn (pt has medication at home ) ·  Hx Rachel Valencia - Pt  requested a refill. for [facility]  
  
 
 
Patient Care Team: 
Samantha Soto MD as PCP - [facility]) Paulo Poe MD (Cardiology) Follow-up Disposition: 
Return in about 2 weeks (around 8/14/2018), or if symptoms worsen or fail to improve. Prior labs and imaging were reviewed. I have discussed the diagnosis with the patient and the intended plan as seen in the above orders. The patient has received an after-visit summary and questions were answered concerning future plans. I have discussed medication side effects and warnings with the patient as well. Patient discussed with Casandra Menendez, Attending Physician. Samantha Soto MD 
 
[address] [place] [address]

## 2018-12-11 PROBLEM — Z72.0 TOBACCO USE: Chronic | Status: ACTIVE | Noted: 2018-01-01

## 2018-12-11 NOTE — PROGRESS NOTES
[facility] Subjective:  
Avril Burns is a 26 y.o. female with history of Intermittent tobacco use CC: Left Shoulder pain History provided by patient and Records HPI: 
Yudith slipped coming down flight of stairs yesterday and tried to catch self with her left arm and felt a \"popping\" sensation immediately under the \"shoulder blade\". Noting after 20 minutes developed aching pain that is 1-2/10 intensity at rest.  Pain becomes a 6-7/10 pain with trying to lift the arm. All shoulder motions cause pain. PFSH:  
 
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 Patient currently going through a divorce. No current outpatient medications on file prior to visit. No current facility-administered medications on file prior to visit. Patient Active Problem List  
Diagnosis Code  Tobacco use Z72.0 Social History Socioeconomic History  Marital status:  Spouse name: Not on file  Number of children: Not on file  Years of education: Not on file  Highest education level: Not on file Social Needs  Financial resource strain: Not on file  Food insecurity - worry: Not on file  Food insecurity - inability: Not on file  Transportation needs - medical: Not on file  Transportation needs - non-medical: Not on file Occupational History  Not on file Tobacco Use  Smoking status: Current Some Day Smoker  Smokeless tobacco: Never Used Substance and Sexual Activity  Alcohol use: No  
 Drug use: Not on file  Sexual activity: Not on file Other Topics Concern  Not on file Social History Narrative  Not on file Review of Systems Gastrointestinal: Negative for nausea and vomiting. Musculoskeletal: Positive for joint pain. Negative for myalgias and neck pain. Neurological: Negative for tingling, tremors, sensory change and focal weakness. Objective:  
 
Visit Vitals BP (!) 142/98 (BP 1 Location: Right arm, BP Patient Position: Sitting) Pulse (!) 102 Temp 98.4 °F (36.9 °C) (Oral) Resp 16 Ht 5' 1\" (1.549 m) Wt 163 lb (73.9 kg) SpO2 97% BMI 30.80 kg/m² Physical Exam  
Constitutional: She appears well-developed and well-nourished. No distress. Cardiovascular: Normal rate, regular rhythm, normal heart sounds and intact distal pulses. Exam reveals no gallop and no friction rub. No murmur heard. Pulmonary/Chest: Effort normal and breath sounds normal.  
Abdominal: Soft. Bowel sounds are normal.  
Musculoskeletal: Normal range of motion. Left shoulder: She exhibits no bony tenderness, no swelling, no deformity and normal strength. Mild tenderness to palpation in the left paraspinal rhomboid region. No winging of the scapula. Normal ROM but pain with ROM. Negative Del Cid, drop arm. No weakness with rotation, extension against resistance. Nursing note and vitals reviewed. Pertinent Labs/Studies: 
 
 
Assessment and orders: ICD-10-CM ICD-9-CM 1. Shoulder strain, left, initial encounter S46.912A 840.9 cyclobenzaprine (FLEXERIL) 10 mg tablet 2. Elevated blood pressure reading R03.0 796.2 Diagnoses and all orders for this visit: 
 
1. Shoulder strain, left, initial encounter: Muscle strain, does not appear to have rotator cuff injury. Given exercise, discussed ICE therapy, and given muscle relaxents. -     cyclobenzaprine (FLEXERIL) 10 mg tablet; Take 0.5 Tabs by mouth three (3) times daily as needed for Muscle Spasm(s). 2. Elevated blood pressure reading: On review patient with history of borderline HTN. Noting multiple stressors at [facility] nad pain at this time. Home with BP log and follow up. Follow-up Disposition: 
Return if symptoms worsen or fail to improve, for please schedule an appointment for PAP.  
 
I have discussed the diagnosis with the patient and the intended plan as seen in the above orders. Social history, medical history, and labs were reviewed. The patient has received an after-visit summary and questions were answered concerning future plans. I have discussed medication side effects and warnings with the patient as well. Lori Manzanares MD 
Resident [facility] 12/11/18 Patient discussed with Dr. Chelly Solomon, Attending Physician

## 2018-12-11 NOTE — LETTER
NOTIFICATION RETURN TO WORK / SCHOOL 
 
12/11/2018 10:11 AM 
 
Ms. Celsa Luque 
[address] [facility] [address] To Whom It May Concern: 
 
Celsa Luque is currently under the care of Deana Treadwell. Please forgive any absence from work on 12/10/18-12/12/18. If there are questions or concerns please have the patient contact our office. Sincerely, Alec Fulton MD

## 2018-12-11 NOTE — PROGRESS NOTES
1. Have you been to the ER, urgent care clinic since your last visit? Hospitalized since your last visit? No 
 
2. Have you seen or consulted any other health care providers outside of the [address] [place] [address] since your last visit? Include any pap smears or colon screening. No 
Reviewed record in preparation for visit and have necessary documentation Pt did not bring medication to office visit for review Goals that were addressed and/or need to be completed during or after this appointment include Health Maintenance Due Topic Date Due  Pneumococcal 19-64 Medium Risk (1 of 1 - PPSV23) 01/25/2002  DTaP/Tdap/Td series (1 - Tdap) 01/25/2004  PAP AKA CERVICAL CYTOLOGY  01/25/2004  Influenza Age 5 to Adult  08/01/2018  
 
pt declines all vaccines

## 2018-12-11 NOTE — PATIENT INSTRUCTIONS
- Please start Naproxen, take 1 tablet 2 times daily. 
- Please start using a heating pad on the left shoulder when you are able to. You may use ice as well, but heat will help the helaing process the most. 
- Please Use the Exercises below, healing from this kind of strain completely can take several weeks. - Please use 1/2 a tablet of Flexeril up to 3 times daily as needed for pain. Please do not drive for at least 2-3 hours after taking Flexeril. Tremayne Delatorre Affiliated with [address] [place] [address], [facility] [address] [place], [address] [place] [address] 
[phone] Monitor blood pressure outside the office several times weekly at different times during the day and evening. Bring the record to me in 3 weeks for review. Blood Pressure Record Patient Name:  ______________________ :  ______________________ Date/Time BP Reading Pulse Home Blood Pressure Test: About This Test 
What is it? A home blood pressure test allows you to keep track of your blood pressure at home. Blood pressure is a measure of the force of blood against the walls of your arteries. Blood pressure readings include two numbers, such as 130/80 (say \"130 over 80\"). The first number is the systolic pressure. The second number is the diastolic pressure. Why is this test done? You may do this test at home to: · Find out if you have high blood pressure. · Track your blood pressure if you have high blood pressure. · Track how well medicine is working to reduce high blood pressure. · Check how lifestyle changes, such as weight loss and exercise, are affecting blood pressure.  
How can you prepare for the test? 
· Do not use caffeine, tobacco, or medicines known to raise blood pressure (such as nasal decongestant sprays) for at least 30 minutes before taking your blood pressure. · Do not exercise for at least 30 minutes before taking your blood pressure. What happens before the test? 
Take your blood pressure while you feel comfortable and relaxed. Sit quietly with both feet on the floor for at least 5 minutes before the test. 
What happens during the test? 
· Sit with your arm slightly bent and resting on a table so that your upper arm is at the same level as your heart. · Roll up your sleeve or take off your shirt to expose your upper arm. · Wrap the blood pressure cuff around your upper arm so that the lower edge of the cuff is about 1 inch above the bend of your elbow. Proceed with the following steps depending on if you are using an automatic or manual pressure monitor. Automatic blood pressure monitors · Press the on/off button on the automatic monitor and wait until the ready-to-measure \"heart\" symbol appears next to zero in the display window. · Press the start button. The cuff will inflate and deflate by itself. · Your blood pressure numbers will appear on the screen. · Write your numbers in your log book, along with the date and time. Manual blood pressure monitors · Place the earpieces of a stethoscope in your ears, and place the bell of the stethoscope over the artery, just below the cuff. · Close the valve on the rubber inflating bulb. · Squeeze the bulb rapidly with your opposite hand to inflate the cuff until the dial or column of mercury reads about 30 mm Hg higher than your usual systolic pressure. If you do not know your usual pressure, inflate the cuff to 210 mm Hg or until the pulse at your wrist disappears. · Open the pressure valve just slightly by twisting or pressing the valve on the bulb.  
· As you watch the pressure slowly fall, note the level on the dial at which you first start to hear a pulsing or tapping sound through the stethoscope. This is your systolic blood pressure. · Continue letting the air out slowly. The sounds will become muffled and will finally disappear. Note the pressure when the sounds completely disappear. This is your diastolic blood pressure. Let out all the remaining air. · Write your numbers in your log book, along with the date and time. What else should you know about the test? 
Here are the categories of blood pressure for adults: 
Ideal blood pressure. Systolic is less than 116, and diastolic is less than 80. Elevated blood pressure. Systolic is 298 to 877, and diastolic is less than 80. High blood pressure (hypertension). Systolic is 900 or above. Diastolic is 80 or above. One or both numbers may be high. It is more accurate to take the average of several readings made throughout the day than to rely on a single reading. Follow-up care is a key part of your treatment and safety. Be sure to make and go to all appointments, and call your doctor if you are having problems. It's also a good idea to keep a list of the medicines you take. Where can you learn more? Go to http://elodia-shannen.info/. Enter C427 in the search box to learn more about \"Home Blood Pressure Test: About This Test.\" Current as of: December 6, 2017 Content Version: 11.8 © 1550-4783 Healthwise, Incorporated. Care instructions adapted under license by [employer] (which disclaims liability or warranty for this information). If you have questions about a medical condition or this instruction, always ask your healthcare professional. [place] [address] any warranty or liability for your use of this information. Rhomboid Muscle Strain: Rehab Exercises Your Care Instructions Here are some examples of typical rehabilitation exercises for your condition. Start each exercise slowly. Ease off the exercise if you start to have pain. Your doctor or physical therapist will tell you when you can start these exercises and which ones will work best for you. How to do the exercises Lower neck and upper back (rhomboid) stretch 1. Stretch your arms out in front of your body. Clasp one hand on top of your other hand. 2. Gently reach out so that you feel your shoulder blades stretching away from each other. 3. Gently bend your head forward. 4. Hold for 15 to 30 seconds. 5. Repeat 2 to 4 times. Resisted rows 1. Put the band around a solid object, such as a bedpost, at about waist level. Stand facing where you have placed the band. Hold equal lengths of the band in each hand. 2. Start with your arms held out in front of you. 3. Pull the bands back, and move your shoulder blades together. As you finish, your elbows should be at your side and bent at 90 degrees (like the angle of the letter \"L\"). 4. Return to the starting position. 5. Repeat 8 to 12 times. Neck stretches 1. Look straight ahead, and tip your right ear to your right shoulder. Do not let your left shoulder rise as you tip your head to the right. 2. Hold for 15 to 30 seconds. 3. Tilt your head to the left. Do not let your right shoulder rise as you tip your head to the left. 4. Hold for 15 to 30 seconds. 5. Repeat 2 to 4 times to each side. Neck rotation 1. Sit in a firm chair, or stand up straight. 2. Keeping your chin level, turn your head to the right, and hold for 15 to 30 seconds. 3. Turn your head to the left, and hold for 15 to 30 seconds. 4. Repeat 2 to 4 times to each side. Follow-up care is a key part of your treatment and safety. Be sure to make and go to all appointments, and call your doctor if you are having problems. It's also a good idea to know your test results and keep a list of the medicines you take. Where can you learn more? Go to http://elodia-shannen.info/. Enter 0841 [id number] in the search box to learn more about \"Rhomboid Muscle Strain: Rehab Exercises. \" Current as of: November 29, 2017 Content Version: 11.8 © 7055-6092 Healthwise, [employer]. Care instructions adapted under license by [employer] (which disclaims liability or warranty for this information). If you have questions about a medical condition or this instruction, always ask your healthcare professional. [place] [address] any warranty or liability for your use of this information.

## 2018-12-17 NOTE — PROGRESS NOTES
I reviewed with the resident the medical history and the resident's findings on the physical examination. I discussed with the resident the patient's diagnosis and concur with the plan. Low threshold for XR if pain not improving. Close f/u for BP.

## 2019-01-01 ENCOUNTER — PATIENT OUTREACH (OUTPATIENT)
Dept: FAMILY MEDICINE CLINIC | Age: 34
End: 2019-01-01

## 2019-01-01 ENCOUNTER — HOSPITAL ENCOUNTER (OUTPATIENT)
Dept: CT IMAGING | Age: 34
Discharge: HOME | End: 2019-04-16
Attending: INTERNAL MEDICINE
Payer: COMMERCIAL

## 2019-01-01 ENCOUNTER — TELEPHONE (OUTPATIENT)
Dept: FAMILY MEDICINE CLINIC | Age: 34
End: 2019-01-01

## 2019-01-01 ENCOUNTER — TELEPHONE (OUTPATIENT)
Dept: CARDIOLOGY CLINIC | Age: 34
End: 2019-01-01

## 2019-01-01 ENCOUNTER — TELEPHONE (OUTPATIENT)
Dept: ONCOLOGY | Age: 34
End: 2019-01-01

## 2019-01-01 ENCOUNTER — OFFICE VISIT (OUTPATIENT)
Dept: FAMILY MEDICINE CLINIC | Age: 34
End: 2019-01-01

## 2019-01-01 ENCOUNTER — APPOINTMENT (OUTPATIENT)
Dept: NON INVASIVE DIAGNOSTICS | Age: 34
End: 2019-01-01
Attending: INTERNAL MEDICINE
Payer: COMMERCIAL

## 2019-01-01 ENCOUNTER — OFFICE VISIT (OUTPATIENT)
Dept: RHEUMATOLOGY | Age: 34
End: 2019-01-01

## 2019-01-01 ENCOUNTER — HOSPITAL ENCOUNTER (OUTPATIENT)
Dept: MRI IMAGING | Age: 34
Discharge: HOME | End: 2019-04-10
Attending: INTERNAL MEDICINE
Payer: COMMERCIAL

## 2019-01-01 ENCOUNTER — HOSPITAL ENCOUNTER (OUTPATIENT)
Dept: ULTRASOUND IMAGING | Age: 34
Discharge: HOME | End: 2019-03-05
Attending: INTERNAL MEDICINE
Payer: COMMERCIAL

## 2019-01-01 ENCOUNTER — HOSPITAL ENCOUNTER (OUTPATIENT)
Dept: CT IMAGING | Age: 34
Discharge: HOME | End: 2019-04-02
Attending: INTERNAL MEDICINE
Payer: COMMERCIAL

## 2019-01-01 ENCOUNTER — OFFICE VISIT (OUTPATIENT)
Dept: ONCOLOGY | Age: 34
End: 2019-01-01

## 2019-01-01 ENCOUNTER — HOSPITAL ENCOUNTER (EMERGENCY)
Age: 34
Discharge: HOME | End: 2019-03-06
Attending: EMERGENCY MEDICINE
Payer: COMMERCIAL

## 2019-01-01 ENCOUNTER — OFFICE VISIT (OUTPATIENT)
Dept: CARDIOLOGY CLINIC | Age: 34
End: 2019-01-01

## 2019-01-01 ENCOUNTER — HOSPITAL ENCOUNTER (OUTPATIENT)
Dept: GENERAL RADIOLOGY | Age: 34
Discharge: HOME | End: 2019-03-05
Attending: INTERNAL MEDICINE
Payer: COMMERCIAL

## 2019-01-01 VITALS
OXYGEN SATURATION: 100 % | BODY MASS INDEX: 27.94 KG/M2 | SYSTOLIC BLOOD PRESSURE: 113 MMHG | HEART RATE: 101 BPM | HEIGHT: 61 IN | WEIGHT: 148 LBS | TEMPERATURE: 99.1 F | RESPIRATION RATE: 23 BRPM | DIASTOLIC BLOOD PRESSURE: 70 MMHG

## 2019-01-01 VITALS
HEART RATE: 110 BPM | BODY MASS INDEX: 27.79 KG/M2 | WEIGHT: 147.2 LBS | OXYGEN SATURATION: 98 % | DIASTOLIC BLOOD PRESSURE: 72 MMHG | SYSTOLIC BLOOD PRESSURE: 110 MMHG | HEIGHT: 61 IN

## 2019-01-01 VITALS
TEMPERATURE: 98.2 F | RESPIRATION RATE: 16 BRPM | HEART RATE: 119 BPM | SYSTOLIC BLOOD PRESSURE: 99 MMHG | HEIGHT: 61 IN | DIASTOLIC BLOOD PRESSURE: 69 MMHG | BODY MASS INDEX: 27.56 KG/M2 | WEIGHT: 146 LBS | OXYGEN SATURATION: 98 %

## 2019-01-01 VITALS
SYSTOLIC BLOOD PRESSURE: 105 MMHG | DIASTOLIC BLOOD PRESSURE: 66 MMHG | OXYGEN SATURATION: 98 % | RESPIRATION RATE: 28 BRPM | HEART RATE: 102 BPM

## 2019-01-01 VITALS
RESPIRATION RATE: 16 BRPM | WEIGHT: 143 LBS | OXYGEN SATURATION: 99 % | HEIGHT: 61 IN | HEART RATE: 117 BPM | SYSTOLIC BLOOD PRESSURE: 106 MMHG | DIASTOLIC BLOOD PRESSURE: 72 MMHG | BODY MASS INDEX: 27 KG/M2 | TEMPERATURE: 98.3 F

## 2019-01-01 VITALS
OXYGEN SATURATION: 99 % | TEMPERATURE: 97.3 F | WEIGHT: 147.2 LBS | SYSTOLIC BLOOD PRESSURE: 108 MMHG | HEART RATE: 114 BPM | DIASTOLIC BLOOD PRESSURE: 76 MMHG | HEIGHT: 61 IN | BODY MASS INDEX: 27.79 KG/M2

## 2019-01-01 VITALS
HEIGHT: 61 IN | OXYGEN SATURATION: 96 % | TEMPERATURE: 98.3 F | HEART RATE: 126 BPM | BODY MASS INDEX: 27.94 KG/M2 | SYSTOLIC BLOOD PRESSURE: 127 MMHG | WEIGHT: 148 LBS | DIASTOLIC BLOOD PRESSURE: 84 MMHG | RESPIRATION RATE: 16 BRPM

## 2019-01-01 VITALS
BODY MASS INDEX: 27.49 KG/M2 | HEIGHT: 61 IN | HEART RATE: 93 BPM | TEMPERATURE: 97.6 F | WEIGHT: 145.6 LBS | OXYGEN SATURATION: 100 % | DIASTOLIC BLOOD PRESSURE: 81 MMHG | SYSTOLIC BLOOD PRESSURE: 119 MMHG

## 2019-01-01 VITALS — BODY MASS INDEX: 27.4 KG/M2 | WEIGHT: 145 LBS

## 2019-01-01 DIAGNOSIS — R77.8 ELEVATED TROPONIN: ICD-10-CM

## 2019-01-01 DIAGNOSIS — R53.83 FATIGUE, UNSPECIFIED TYPE: ICD-10-CM

## 2019-01-01 DIAGNOSIS — Z72.0 TOBACCO USE: Chronic | ICD-10-CM

## 2019-01-01 DIAGNOSIS — R07.89 OTHER CHEST PAIN: ICD-10-CM

## 2019-01-01 DIAGNOSIS — R00.0 TACHYCARDIA: ICD-10-CM

## 2019-01-01 DIAGNOSIS — D72.10 EOSINOPHILIA: Primary | ICD-10-CM

## 2019-01-01 DIAGNOSIS — I42.3: Primary | ICD-10-CM

## 2019-01-01 DIAGNOSIS — Z79.60 LONG-TERM USE OF IMMUNOSUPPRESSANT MEDICATION: ICD-10-CM

## 2019-01-01 DIAGNOSIS — R06.02 SHORTNESS OF BREATH: ICD-10-CM

## 2019-01-01 DIAGNOSIS — D72.10 EOSINOPHILIA: ICD-10-CM

## 2019-01-01 DIAGNOSIS — M79.642 PAIN IN BOTH HANDS: ICD-10-CM

## 2019-01-01 DIAGNOSIS — R19.7 DIARRHEA, UNSPECIFIED TYPE: ICD-10-CM

## 2019-01-01 DIAGNOSIS — R68.89 COLD INTOLERANCE: ICD-10-CM

## 2019-01-01 DIAGNOSIS — D72.820 LYMPHOCYTOSIS: ICD-10-CM

## 2019-01-01 DIAGNOSIS — M79.641 PAIN IN BOTH HANDS: ICD-10-CM

## 2019-01-01 DIAGNOSIS — R63.1 POLYDIPSIA: Primary | ICD-10-CM

## 2019-01-01 DIAGNOSIS — E53.8 LOW FOLATE: Primary | ICD-10-CM

## 2019-01-01 DIAGNOSIS — D72.19 EOSINOPHILIC LEUKOCYTOSIS: ICD-10-CM

## 2019-01-01 DIAGNOSIS — I10 ESSENTIAL HYPERTENSION: ICD-10-CM

## 2019-01-01 DIAGNOSIS — Z09 ENCOUNTER FOR EXAMINATION FOLLOWING TREATMENT AT HOSPITAL: ICD-10-CM

## 2019-01-01 DIAGNOSIS — R07.9 ACUTE CHEST PAIN: Primary | ICD-10-CM

## 2019-01-01 DIAGNOSIS — R29.898 WEAKNESS OF BOTH LEGS: ICD-10-CM

## 2019-01-01 DIAGNOSIS — R25.2 MUSCLE CRAMPS: ICD-10-CM

## 2019-01-01 DIAGNOSIS — R23.3 EASY BRUISING: ICD-10-CM

## 2019-01-01 DIAGNOSIS — I42.3: ICD-10-CM

## 2019-01-01 DIAGNOSIS — A09 DIARRHEA OF INFECTIOUS ORIGIN: ICD-10-CM

## 2019-01-01 DIAGNOSIS — I51.9 LV DYSFUNCTION: Primary | ICD-10-CM

## 2019-01-01 DIAGNOSIS — R53.83 FATIGUE, UNSPECIFIED TYPE: Primary | ICD-10-CM

## 2019-01-01 DIAGNOSIS — R76.8 ELEVATED RHEUMATOID FACTOR: Primary | ICD-10-CM

## 2019-01-01 DIAGNOSIS — I51.9 LEFT VENTRICULAR DYSFUNCTION: Primary | ICD-10-CM

## 2019-01-01 DIAGNOSIS — R76.8 RHEUMATOID FACTOR POSITIVE: Primary | ICD-10-CM

## 2019-01-01 DIAGNOSIS — I34.0 NON-RHEUMATIC MITRAL REGURGITATION: ICD-10-CM

## 2019-01-01 LAB
ALBUMIN SERPL-MCNC: 2.6 G/DL (ref 3.5–5)
ALBUMIN SERPL-MCNC: 3.6 G/DL (ref 3.5–5.5)
ALBUMIN SERPL-MCNC: 3.7 G/DL (ref 3.5–5.5)
ALBUMIN/GLOB SERPL: 0.5 {RATIO} (ref 1.1–2.2)
ALBUMIN/GLOB SERPL: 0.9 {RATIO} (ref 1.2–2.2)
ALBUMIN/GLOB SERPL: 0.9 {RATIO} (ref 1.2–2.2)
ALP SERPL-CCNC: 119 U/L (ref 45–117)
ALP SERPL-CCNC: 120 IU/L (ref 39–117)
ALP SERPL-CCNC: 131 IU/L (ref 39–117)
ALT SERPL-CCNC: 35 IU/L (ref 0–32)
ALT SERPL-CCNC: 35 IU/L (ref 0–32)
ALT SERPL-CCNC: 44 U/L (ref 12–78)
ANA SER QL: NEGATIVE
ANION GAP SERPL CALC-SCNC: 8 MMOL/L (ref 5–15)
AST SERPL-CCNC: 27 IU/L (ref 0–40)
AST SERPL-CCNC: 34 U/L (ref 15–37)
AST SERPL-CCNC: 39 IU/L (ref 0–40)
ATRIAL RATE: 104 BPM
BACKGROUND: 480503: NORMAL
BACTERIA SPEC CULT: NORMAL
BACTERIA SPEC CULT: NORMAL
BASOPHILS # BLD AUTO: 0.1 X10E3/UL (ref 0–0.2)
BASOPHILS # BLD AUTO: 0.1 X10E3/UL (ref 0–0.2)
BASOPHILS # BLD: 0 K/UL (ref 0–0.1)
BASOPHILS # BLD: 0 K/UL (ref 0–0.1)
BASOPHILS NFR BLD AUTO: 0 %
BASOPHILS NFR BLD AUTO: 0 %
BASOPHILS NFR BLD: 0 % (ref 0–1)
BASOPHILS NFR BLD: 0 % (ref 0–1)
BILIRUB SERPL-MCNC: 0.8 MG/DL (ref 0–1.2)
BILIRUB SERPL-MCNC: 1 MG/DL (ref 0.2–1)
BILIRUB SERPL-MCNC: 1.4 MG/DL (ref 0–1.2)
BILIRUB UR QL STRIP: NEGATIVE
BUN SERPL-MCNC: 3 MG/DL (ref 6–20)
BUN SERPL-MCNC: 5 MG/DL (ref 6–20)
BUN SERPL-MCNC: 7 MG/DL (ref 6–20)
BUN/CREAT SERPL: 3 (ref 9–23)
BUN/CREAT SERPL: 5 (ref 12–20)
BUN/CREAT SERPL: 7 (ref 9–23)
CALCIUM SERPL-MCNC: 8.1 MG/DL (ref 8.5–10.1)
CALCIUM SERPL-MCNC: 8.9 MG/DL (ref 8.7–10.2)
CALCIUM SERPL-MCNC: 8.9 MG/DL (ref 8.7–10.2)
CALCULATED P AXIS, ECG09: 65 DEGREES
CALCULATED R AXIS, ECG10: 53 DEGREES
CALCULATED T AXIS, ECG11: 32 DEGREES
CELLS ANALYZED: 200
CELLS COUNTED: 200
CHLORIDE SERPL-SCNC: 101 MMOL/L (ref 96–106)
CHLORIDE SERPL-SCNC: 103 MMOL/L (ref 97–108)
CHLORIDE SERPL-SCNC: 98 MMOL/L (ref 96–106)
CHROM ANALY RESULT (ISCN): NORMAL
CK SERPL-CCNC: 565 U/L (ref 24–173)
CLINICAL CYTOGENETICIST SPEC: NORMAL
CO2 SERPL-SCNC: 22 MMOL/L (ref 20–29)
CO2 SERPL-SCNC: 25 MMOL/L (ref 20–29)
CO2 SERPL-SCNC: 27 MMOL/L (ref 21–32)
CREAT SERPL-MCNC: 0.87 MG/DL (ref 0.57–1)
CREAT SERPL-MCNC: 0.94 MG/DL (ref 0.57–1)
CREAT SERPL-MCNC: 1.06 MG/DL (ref 0.55–1.02)
DIAGNOSIS, 93000: NORMAL
DIAGNOSTIC IMP SPEC-IMP: NORMAL
DIFFERENTIAL METHOD BLD: ABNORMAL
DIFFERENTIAL METHOD BLD: ABNORMAL
ECHO AO ROOT DIAM: 2.96 CM
ECHO AV AREA PEAK VELOCITY: 2.3 CM2
ECHO AV PEAK GRADIENT: 5.3 MMHG
ECHO AV PEAK VELOCITY: 115.32 CM/S
ECHO EST RA PRESSURE: 3 MMHG
ECHO LA MAJOR AXIS: 2.87 CM
ECHO LA TO AORTIC ROOT RATIO: 0.97
ECHO LA VOL 2C: 24.98 ML (ref 22–52)
ECHO LA VOL 4C: 29.96 ML (ref 22–52)
ECHO LA VOLUME INDEX A2C: 15.03 ML/M2 (ref 16–28)
ECHO LA VOLUME INDEX A4C: 18.02 ML/M2 (ref 16–28)
ECHO LV INTERNAL DIMENSION DIASTOLIC: 4.53 CM (ref 3.9–5.3)
ECHO LV INTERNAL DIMENSION SYSTOLIC: 3.19 CM
ECHO LV IVSD: 0.88 CM (ref 0.6–0.9)
ECHO LV MASS 2D: 130.8 G (ref 67–162)
ECHO LV MASS INDEX 2D: 78.7 G/M2 (ref 43–95)
ECHO LV POSTERIOR WALL DIASTOLIC: 0.73 CM (ref 0.6–0.9)
ECHO LVOT DIAM: 2.03 CM
ECHO LVOT PEAK GRADIENT: 2.6 MMHG
ECHO LVOT PEAK VELOCITY: 80.61 CM/S
ECHO MV A VELOCITY: 90.36 CM/S
ECHO MV E DECELERATION TIME (DT): 183.1 MS
ECHO MV E VELOCITY: 79.56 CM/S
ECHO MV E/A RATIO: 0.88
ECHO MV REGURGITANT PEAK GRADIENT: 118 MMHG
ECHO MV REGURGITANT PEAK VELOCITY: 543.18 CM/S
ECHO PULMONARY ARTERY SYSTOLIC PRESSURE (PASP): 33.6 MMHG
ECHO PV MAX VELOCITY: 73.62 CM/S
ECHO PV PEAK GRADIENT: 2.2 MMHG
ECHO RIGHT VENTRICULAR SYSTOLIC PRESSURE (RVSP): 33.6 MMHG
ECHO RV INTERNAL DIMENSION: 2.55 CM
ECHO TV REGURGITANT MAX VELOCITY: 276.71 CM/S
ECHO TV REGURGITANT PEAK GRADIENT: 30.6 MMHG
EOSINOPHIL # BLD AUTO: 18.8 X10E3/UL (ref 0–0.4)
EOSINOPHIL # BLD AUTO: 19.4 X10E3/UL (ref 0–0.4)
EOSINOPHIL # BLD: 21.9 K/UL (ref 0–0.4)
EOSINOPHIL # BLD: 29 K/UL (ref 0–0.4)
EOSINOPHIL NFR BLD AUTO: 69 %
EOSINOPHIL NFR BLD AUTO: 74 %
EOSINOPHIL NFR BLD: 71 % (ref 0–7)
EOSINOPHIL NFR BLD: 87 % (ref 0–7)
ERYTHROCYTE [DISTWIDTH] IN BLOOD BY AUTOMATED COUNT: 14.7 % (ref 11.5–14.5)
ERYTHROCYTE [DISTWIDTH] IN BLOOD BY AUTOMATED COUNT: 15.1 % (ref 11.5–14.5)
ERYTHROCYTE [DISTWIDTH] IN BLOOD BY AUTOMATED COUNT: 16.3 % (ref 12.3–15.4)
ERYTHROCYTE [DISTWIDTH] IN BLOOD BY AUTOMATED COUNT: 17.1 % (ref 12.3–15.4)
ERYTHROCYTE [SEDIMENTATION RATE] IN BLOOD BY WESTERGREN METHOD: 20 MM/HR (ref 0–32)
EST. AVERAGE GLUCOSE BLD GHB EST-MCNC: <74 MG/DL
FOLATE SERPL-MCNC: <1.5 NG/ML
GLOBULIN SER CALC-MCNC: 4.2 G/DL (ref 1.5–4.5)
GLOBULIN SER CALC-MCNC: 4.3 G/DL (ref 1.5–4.5)
GLOBULIN SER CALC-MCNC: 5.1 G/DL (ref 2–4)
GLUCOSE SERPL-MCNC: 81 MG/DL (ref 65–99)
GLUCOSE SERPL-MCNC: 83 MG/DL (ref 65–99)
GLUCOSE SERPL-MCNC: 84 MG/DL (ref 65–100)
GLUCOSE UR-MCNC: NEGATIVE MG/DL
HBA1C MFR BLD HPLC: 5.3 %
HBA1C MFR BLD: <4.2 % (ref 4.8–5.6)
HCT VFR BLD AUTO: 30.4 % (ref 35–47)
HCT VFR BLD AUTO: 34.8 % (ref 34–46.6)
HCT VFR BLD AUTO: 36.8 % (ref 35–47)
HCT VFR BLD AUTO: 37.5 % (ref 34–46.6)
HGB BLD-MCNC: 10.1 G/DL (ref 11.5–16)
HGB BLD-MCNC: 11.1 G/DL
HGB BLD-MCNC: 11.5 G/DL (ref 11.1–15.9)
HGB BLD-MCNC: 12.2 G/DL (ref 11.5–16)
HGB BLD-MCNC: 12.7 G/DL (ref 11.1–15.9)
IMM GRANULOCYTES # BLD AUTO: 0 K/UL
IMM GRANULOCYTES # BLD AUTO: 0 K/UL (ref 0–0.04)
IMM GRANULOCYTES # BLD AUTO: 0 X10E3/UL (ref 0–0.1)
IMM GRANULOCYTES # BLD AUTO: 0 X10E3/UL (ref 0–0.1)
IMM GRANULOCYTES NFR BLD AUTO: 0 %
IMM GRANULOCYTES NFR BLD AUTO: 0 % (ref 0–0.5)
JAK2 P.V617F BLD/T QL: NORMAL
KETONES P FAST UR STRIP-MCNC: NEGATIVE MG/DL
LAB DIRECTOR NAME PROVIDER: NORMAL
LDH SERPL-CCNC: 419 IU/L (ref 119–226)
LYMPHOCYTES # BLD AUTO: 4.7 X10E3/UL (ref 0.7–3.1)
LYMPHOCYTES # BLD AUTO: 5.3 X10E3/UL (ref 0.7–3.1)
LYMPHOCYTES # BLD: 3 K/UL (ref 0.8–3.5)
LYMPHOCYTES # BLD: 5.9 K/UL (ref 0.8–3.5)
LYMPHOCYTES NFR BLD AUTO: 18 %
LYMPHOCYTES NFR BLD AUTO: 20 %
LYMPHOCYTES NFR BLD: 19 % (ref 12–49)
LYMPHOCYTES NFR BLD: 9 % (ref 12–49)
MCH RBC QN AUTO: 26.6 PG (ref 26.6–33)
MCH RBC QN AUTO: 26.9 PG (ref 26.6–33)
MCH RBC QN AUTO: 27 PG (ref 26–34)
MCH RBC QN AUTO: 27.4 PG (ref 26–34)
MCHC RBC AUTO-ENTMCNC: 33 G/DL (ref 31.5–35.7)
MCHC RBC AUTO-ENTMCNC: 33.2 G/DL (ref 30–36.5)
MCHC RBC AUTO-ENTMCNC: 33.2 G/DL (ref 30–36.5)
MCHC RBC AUTO-ENTMCNC: 33.9 G/DL (ref 31.5–35.7)
MCV RBC AUTO: 79 FL (ref 79–97)
MCV RBC AUTO: 81 FL (ref 79–97)
MCV RBC AUTO: 81.4 FL (ref 80–99)
MCV RBC AUTO: 82.6 FL (ref 80–99)
MONOCYTES # BLD AUTO: 0.6 X10E3/UL (ref 0.1–0.9)
MONOCYTES # BLD AUTO: 0.7 X10E3/UL (ref 0.1–0.9)
MONOCYTES # BLD: 0.6 K/UL (ref 0–1)
MONOCYTES # BLD: 0.7 K/UL (ref 0–1)
MONOCYTES NFR BLD AUTO: 2 %
MONOCYTES NFR BLD AUTO: 3 %
MONOCYTES NFR BLD: 2 % (ref 5–13)
MONOCYTES NFR BLD: 2 % (ref 5–13)
MORPHOLOGY BLD-IMP: ABNORMAL
MORPHOLOGY BLD-IMP: ABNORMAL
NEUTROPHILS # BLD AUTO: 1.7 X10E3/UL (ref 1.4–7)
NEUTROPHILS # BLD AUTO: 2.3 X10E3/UL (ref 1.4–7)
NEUTROPHILS NFR BLD AUTO: 6 %
NEUTROPHILS NFR BLD AUTO: 8 %
NEUTS SEG # BLD: 0.7 K/UL (ref 1.8–8)
NEUTS SEG # BLD: 2.5 K/UL (ref 1.8–8)
NEUTS SEG NFR BLD: 2 % (ref 32–75)
NEUTS SEG NFR BLD: 8 % (ref 32–75)
NRBC # BLD: 0 K/UL (ref 0–0.01)
NRBC # BLD: 0 K/UL (ref 0–0.01)
NRBC BLD-RTO: 0 PER 100 WBC
NRBC BLD-RTO: 0 PER 100 WBC
P-R INTERVAL, ECG05: 124 MS
PATH INTERP BLD-IMP: ABNORMAL
PATH REV BLD -IMP: ABNORMAL
PATHOLOGIST NAME: ABNORMAL
PH UR STRIP: 7 [PH] (ref 4.6–8)
PLATELET # BLD AUTO: 375 X10E3/UL (ref 150–379)
PLATELET # BLD AUTO: 376 K/UL (ref 150–400)
PLATELET # BLD AUTO: 400 X10E3/UL (ref 150–379)
PLATELET # BLD AUTO: 427 K/UL (ref 150–400)
PMV BLD AUTO: 10 FL (ref 8.9–12.9)
PMV BLD AUTO: 9.8 FL (ref 8.9–12.9)
POTASSIUM SERPL-SCNC: 3.2 MMOL/L (ref 3.5–5.1)
POTASSIUM SERPL-SCNC: 3.4 MMOL/L (ref 3.5–5.2)
POTASSIUM SERPL-SCNC: 3.6 MMOL/L (ref 3.5–5.2)
PROT SERPL-MCNC: 7.7 G/DL (ref 6.4–8.2)
PROT SERPL-MCNC: 7.8 G/DL (ref 6–8.5)
PROT SERPL-MCNC: 8 G/DL (ref 6–8.5)
PROT UR QL STRIP: NEGATIVE
Q-T INTERVAL, ECG07: 324 MS
QRS DURATION, ECG06: 72 MS
QTC CALCULATION (BEZET), ECG08: 426 MS
RBC # BLD AUTO: 3.68 M/UL (ref 3.8–5.2)
RBC # BLD AUTO: 4.28 X10E6/UL (ref 3.77–5.28)
RBC # BLD AUTO: 4.52 M/UL (ref 3.8–5.2)
RBC # BLD AUTO: 4.77 X10E6/UL (ref 3.77–5.28)
RBC MORPH BLD: ABNORMAL
RHEUMATOID FACT SERPL-ACNC: 461.7 IU/ML (ref 0–13.9)
SERVICE CMNT-IMP: NORMAL
SERVICE CMNT-IMP: NORMAL
SODIUM SERPL-SCNC: 138 MMOL/L (ref 134–144)
SODIUM SERPL-SCNC: 138 MMOL/L (ref 134–144)
SODIUM SERPL-SCNC: 138 MMOL/L (ref 136–145)
SP GR UR STRIP: 1.01 (ref 1–1.03)
SPECIMEN SOURCE: NORMAL
STRESS ANGINA INDEX: 0
STRESS ESTIMATED WORKLOAD: 7 METS
STRESS EXERCISE DUR MIN: 5 MIN:SEC
STRESS SR DUKE TREADMILL SCORE: 5
STRESS ST DEPRESSION: 0 MM
STRESS ST ELEVATION: 0 MM
STRESS TARGET HR: 156 BPM
STRONGYLOIDES AB, IGG, STRGLT: NEGATIVE
TROPONIN I SERPL-MCNC: 3.54 NG/ML
TROPONIN I SERPL-MCNC: 6.85 NG/ML (ref 0–0.04)
TRYPTASE SERPL-MCNC: 10.2 UG/L (ref 2.2–13.2)
TSH SERPL DL<=0.005 MIU/L-ACNC: 1.21 UIU/ML (ref 0.45–4.5)
UA UROBILINOGEN AMB POC: NORMAL (ref 0.2–1)
URINALYSIS CLARITY POC: CLEAR
URINALYSIS COLOR POC: YELLOW
URINE BLOOD POC: NORMAL
URINE LEUKOCYTES POC: NORMAL
URINE NITRITES POC: NEGATIVE
VENTRICULAR RATE, ECG03: 104 BPM
VIT B12 SERPL-MCNC: 1162 PG/ML (ref 232–1245)
WBC # BLD AUTO: 26.5 X10E3/UL (ref 3.4–10.8)
WBC # BLD AUTO: 27.3 X10E3/UL (ref 3.4–10.8)
WBC # BLD AUTO: 30.9 K/UL (ref 3.6–11)
WBC # BLD AUTO: 33.4 K/UL (ref 3.6–11)

## 2019-01-01 PROCEDURE — 93351 STRESS TTE COMPLETE: CPT

## 2019-01-01 PROCEDURE — A9579 GAD-BASE MR CONTRAST NOS,1ML: HCPCS | Performed by: INTERNAL MEDICINE

## 2019-01-01 PROCEDURE — 85025 COMPLETE CBC W/AUTO DIFF WBC: CPT

## 2019-01-01 PROCEDURE — 93306 TTE W/DOPPLER COMPLETE: CPT

## 2019-01-01 PROCEDURE — 99153 MOD SED SAME PHYS/QHP EA: CPT

## 2019-01-01 PROCEDURE — 99152 MOD SED SAME PHYS/QHP 5/>YRS: CPT

## 2019-01-01 PROCEDURE — 36415 COLL VENOUS BLD VENIPUNCTURE: CPT

## 2019-01-01 PROCEDURE — 77030021566

## 2019-01-01 PROCEDURE — 80053 COMPREHEN METABOLIC PANEL: CPT

## 2019-01-01 PROCEDURE — 77030028872 HC BN BIOP NDL ON CNTRL TY TELE -C

## 2019-01-01 PROCEDURE — 77030014115

## 2019-01-01 PROCEDURE — 94762 N-INVAS EAR/PLS OXIMTRY CONT: CPT

## 2019-01-01 PROCEDURE — 75561 CARDIAC MRI FOR MORPH W/DYE: CPT

## 2019-01-01 PROCEDURE — 87040 BLOOD CULTURE FOR BACTERIA: CPT

## 2019-01-01 PROCEDURE — 71046 X-RAY EXAM CHEST 2 VIEWS: CPT

## 2019-01-01 PROCEDURE — 38221 DX BONE MARROW BIOPSIES: CPT

## 2019-01-01 PROCEDURE — 76700 US EXAM ABDOM COMPLETE: CPT

## 2019-01-01 PROCEDURE — 93017 CV STRESS TEST TRACING ONLY: CPT

## 2019-01-01 PROCEDURE — 77030003666 HC NDL SPINAL BD -A

## 2019-01-01 PROCEDURE — 74011250636 HC RX REV CODE- 250/636: Performed by: RADIOLOGY

## 2019-01-01 PROCEDURE — 74011636320 HC RX REV CODE- 636/320: Performed by: INTERNAL MEDICINE

## 2019-01-01 PROCEDURE — 93005 ELECTROCARDIOGRAM TRACING: CPT

## 2019-01-01 PROCEDURE — 99285 EMERGENCY DEPT VISIT HI MDM: CPT

## 2019-01-01 PROCEDURE — 84484 ASSAY OF TROPONIN QUANT: CPT

## 2019-01-01 PROCEDURE — 71250 CT THORAX DX C-: CPT

## 2019-01-01 RX ORDER — CARVEDILOL 3.12 MG/1
3.12 TABLET ORAL 2 TIMES DAILY WITH MEALS
Qty: 60 TAB | Refills: 0 | Status: SHIPPED | OUTPATIENT
Start: 2019-01-01

## 2019-01-01 RX ORDER — LISINOPRIL 5 MG/1
5 TABLET ORAL DAILY
Qty: 30 TAB | Refills: 0 | Status: SHIPPED | OUTPATIENT
Start: 2019-01-01

## 2019-01-01 RX ORDER — FENTANYL CITRATE 50 UG/ML
100 INJECTION, SOLUTION INTRAMUSCULAR; INTRAVENOUS
Status: DISCONTINUED | OUTPATIENT
Start: 2019-01-01 | End: 2019-01-01 | Stop reason: HOSPADM

## 2019-01-01 RX ORDER — LIDOCAINE HYDROCHLORIDE 10 MG/ML
10 INJECTION, SOLUTION EPIDURAL; INFILTRATION; INTRACAUDAL; PERINEURAL
Status: ACTIVE | OUTPATIENT
Start: 2019-01-01 | End: 2019-01-01

## 2019-01-01 RX ORDER — SODIUM CHLORIDE 0.9 % (FLUSH) 0.9 %
5-40 SYRINGE (ML) INJECTION EVERY 8 HOURS
Status: CANCELLED | OUTPATIENT
Start: 2019-04-24

## 2019-01-01 RX ORDER — MIDAZOLAM HYDROCHLORIDE 1 MG/ML
5 INJECTION, SOLUTION INTRAMUSCULAR; INTRAVENOUS
Status: DISCONTINUED | OUTPATIENT
Start: 2019-01-01 | End: 2019-01-01 | Stop reason: HOSPADM

## 2019-01-01 RX ORDER — FOLIC ACID 1 MG/1
1 TABLET ORAL DAILY
Qty: 30 TAB | Refills: 0 | Status: SHIPPED | OUTPATIENT
Start: 2019-01-01

## 2019-01-01 RX ORDER — SODIUM CHLORIDE 0.9 % (FLUSH) 0.9 %
5-40 SYRINGE (ML) INJECTION AS NEEDED
Status: CANCELLED | OUTPATIENT
Start: 2019-04-24

## 2019-01-01 RX ADMIN — MIDAZOLAM 0.5 MG: 1 INJECTION INTRAMUSCULAR; INTRAVENOUS at 10:33

## 2019-01-01 RX ADMIN — FENTANYL CITRATE 25 MCG: 50 INJECTION, SOLUTION INTRAMUSCULAR; INTRAVENOUS at 10:27

## 2019-01-01 RX ADMIN — GADOPENTETATE DIMEGLUMINE 28 ML: 469.01 INJECTION INTRAVENOUS at 13:00

## 2019-01-01 RX ADMIN — FENTANYL CITRATE 12.5 MCG: 50 INJECTION, SOLUTION INTRAMUSCULAR; INTRAVENOUS at 10:33

## 2019-01-01 RX ADMIN — FENTANYL CITRATE 25 MCG: 50 INJECTION, SOLUTION INTRAMUSCULAR; INTRAVENOUS at 10:23

## 2019-01-01 RX ADMIN — MIDAZOLAM 0.5 MG: 1 INJECTION INTRAMUSCULAR; INTRAVENOUS at 10:30

## 2019-01-01 RX ADMIN — FENTANYL CITRATE 12.5 MCG: 50 INJECTION, SOLUTION INTRAMUSCULAR; INTRAVENOUS at 10:30

## 2019-01-01 RX ADMIN — MIDAZOLAM 1 MG: 1 INJECTION INTRAMUSCULAR; INTRAVENOUS at 10:27

## 2019-01-01 RX ADMIN — MIDAZOLAM 1 MG: 1 INJECTION INTRAMUSCULAR; INTRAVENOUS at 10:23

## 2019-02-19 NOTE — Clinical Note
Hi,I asked Megan to do this yesterday but noticed she hasn't. I want this lady to see Heme/Onc urgently. I'm worried she may have some type of blood cancer or other serious process causing her symptoms. I am not sure if Casper Sahu has talked to her about this yet but I will make sure she does.  [email or web address]

## 2019-02-19 NOTE — PATIENT INSTRUCTIONS
A Healthy Lifestyle: Care Instructions Your Care Instructions A healthy lifestyle can help you feel good, stay at a healthy weight, and have plenty of energy for both work and play. A healthy lifestyle is something you can share with your whole family. A healthy lifestyle also can lower your risk for serious health problems, such as high blood pressure, heart disease, and diabetes. You can follow a few steps listed below to improve your health and the health of your family. Follow-up care is a key part of your treatment and safety. Be sure to make and go to all appointments, and call your doctor if you are having problems. It's also a good idea to know your test results and keep a list of the medicines you take. How can you care for yourself at home? · Do not eat too much sugar, fat, or fast foods. You can still have dessert and treats now and then. The goal is moderation. · Start small to improve your eating habits. Pay attention to portion sizes, drink less juice and soda pop, and eat more fruits and vegetables. ? Eat a healthy amount of food. A 3-ounce serving of meat, for example, is about the size of a deck of cards. Fill the rest of your plate with vegetables and whole grains. ? Limit the amount of soda and sports drinks you have every day. Drink more water when you are thirsty. ? Eat at least 5 servings of fruits and vegetables every day. It may seem like a lot, but it is not hard to reach this goal. A serving or helping is 1 piece of fruit, 1 cup of vegetables, or 2 cups of leafy, raw vegetables. Have an apple or some carrot sticks as an afternoon snack instead of a candy bar. Try to have fruits and/or vegetables at every meal. 
· Make exercise part of your daily routine. You may want to start with simple activities, such as walking, bicycling, or slow swimming. Try to be active 30 to 60 minutes every day.  You do not need to do all 30 to 60 minutes all at once. For example, you can exercise 3 times a day for 10 or 20 minutes. Moderate exercise is safe for most people, but it is always a good idea to talk to your doctor before starting an exercise program. 
· Keep moving. Juan Agustin the lawn, work in the garden, or [employer] Take the stairs instead of the elevator at work. · If you smoke, quit. People who smoke have an increased risk for heart attack, stroke, cancer, and other lung illnesses. Quitting is hard, but there are ways to boost your chance of quitting tobacco for good. ? Use nicotine gum, patches, or lozenges. ? Ask your doctor about stop-smoking programs and medicines. ? Keep trying. In addition to reducing your risk of diseases in the future, you will notice some benefits soon after you stop using tobacco. If you have shortness of breath or asthma symptoms, they will likely get better within a few weeks after you quit. · Limit how much alcohol you drink. Moderate amounts of alcohol (up to 2 drinks a day for men, 1 drink a day for women) are okay. But drinking too much can lead to liver problems, high blood pressure, and other health problems. Family health If you have a family, there are many things you can do together to improve your health. · Eat meals together as a family as often as possible. · Eat healthy foods. This includes fruits, vegetables, lean meats and dairy, and whole grains. · Include your family in your fitness plan. Most people think of activities such as jogging or tennis as the way to fitness, but there are many ways you and your family can be more active. Anything that makes you breathe hard and gets your heart pumping is exercise. Here are some tips: 
? Walk to do errands or to take your child to school or the bus. 
? Go for a family bike ride after dinner instead of watching TV. Where can you learn more? Go to http://elodia-shannen.info/. Enter C634 in the search box to learn more about \"A Healthy Lifestyle: Care Instructions. \" Current as of: September 11, 2018 Content Version: 11.9 © 5699-0410 [employer]. Care instructions adapted under license by [employer] (which disclaims liability or warranty for this information). If you have questions about a medical condition or this instruction, always ask your healthcare professional. [address] any warranty or liability for your use of this information. Chest Pain: Care Instructions Your Care Instructions There are many things that can cause chest pain. Some are not serious and will get better on their own in a few days. But some kinds of chest pain need more testing and treatment. Your doctor may have recommended a follow-up visit in the next 8 to 12 hours. If you are not getting better, you may need more tests or treatment. Even though your doctor has released you, you still need to watch for any problems. The doctor carefully checked you, but sometimes problems can develop later. If you have new symptoms or if your symptoms do not get better, get medical care right away. If you have worse or different chest pain or pressure that lasts more than 5 minutes or you passed out (lost consciousness), call 911 or seek other emergency help right away. A medical visit is only one step in your treatment. Even if you feel better, you still need to do what your doctor recommends, such as going to all suggested follow-up appointments and taking medicines exactly as directed. This will help you recover and help prevent future problems. How can you care for yourself at home? · Rest until you feel better. · Take your medicine exactly as prescribed. Call your doctor if you think you are having a problem with your medicine. · Do not drive after taking a prescription pain medicine. When should you call for help? Call 911 if:   · You passed out (lost consciousness).  
  · You have severe difficulty breathing.  
  · You have symptoms of a heart attack. These may include: 
? Chest pain or pressure, or a strange feeling in your chest. 
? Sweating. ? Shortness of breath. ? Nausea or vomiting. ? Pain, pressure, or a strange feeling in your back, neck, jaw, or upper belly or in one or both shoulders or arms. ? Lightheadedness or sudden weakness. ? A fast or irregular heartbeat. After you call 911, the  may tell you to chew 1 adult-strength or 2 to 4 low-dose aspirin. Wait for an ambulance. Do not try to drive yourself.  
 Call your doctor today if: 
  · You have any trouble breathing.  
  · Your chest pain gets worse.  
  · You are dizzy or lightheaded, or you feel like you may faint.  
  · You are not getting better as expected.  
  · You are having new or different chest pain. Where can you learn more? Go to http://elodia-shannen.info/. Enter A120 in the search box to learn more about \"Chest Pain: Care Instructions. \" Current as of: September 23, 2018 Content Version: 11.9 © 2545-1437 [employer]. Care instructions adapted under license by [employer] (which disclaims liability or warranty for this information). If you have questions about a medical condition or this instruction, always ask your healthcare professional. [place] [address] any warranty or liability for your use of this information. Numbness and Tingling: Care Instructions Your Care Instructions Many things can cause numbness or tingling. Swelling may put pressure on a nerve. This could cause you to lose feeling or have a pins-and-needles sensation on part of your body. Nerves may be damaged from trauma, toxins, or diseases, such as diabetes or multiple sclerosis (MS). Sometimes, though, the cause is not clear.  
If there is no clear reason for your symptoms, and you are not having any other symptoms, your doctor may suggest watching and waiting for a while to see if the numbness or tingling goes away on its own. Your doctor may want you to have blood or nerve tests to find the cause of your symptoms. Follow-up care is a key part of your treatment and safety. Be sure to make and go to all appointments, and call your doctor if you are having problems. It's also a good idea to know your test results and keep a list of the medicines you take. How can you care for yourself at home? · If your doctor prescribes medicine, take it exactly as directed. Call your doctor if you think you are having a problem with your medicine. · If you have any swelling, put ice or a cold pack on the area for 10 to 20 minutes at a time. Put a thin cloth between the ice and your skin. When should you call for help? Call 911 anytime you think you may need emergency care. For example, call if: 
  · You have weakness, numbness, or tingling in both legs.  
  · You lose bowel or bladder control.  
  · You have symptoms of a stroke. These may include: 
? Sudden numbness, tingling, weakness, or loss of movement in your face, arm, or leg, especially on only one side of your body. ? Sudden vision changes. ? Sudden trouble speaking. ? Sudden confusion or trouble understanding simple statements. ? Sudden problems with walking or balance. ? A sudden, severe headache that is different from past headaches.  
 Watch closely for changes in your health, and be sure to contact your doctor if you have any problems, or if: 
  · You do not get better as expected. Where can you learn more? Go to http://elodia-shannen.info/. Enter J237 in the search box to learn more about \"Numbness and Tingling: Care Instructions. \" Current as of: Hanna 3, 2018 Content Version: 11.9 © 1707-4492 [employer].  Care instructions adapted under license by [employer] (which disclaims liability or warranty for this information). If you have questions about a medical condition or this instruction, always ask your healthcare professional. [place] [address] any warranty or liability for your use of this information.

## 2019-02-19 NOTE — LETTER
NOTIFICATION RETURN TO WORK / SCHOOL 
 
2/19/2019 4:55 PM 
 
Ms. Marino Borden 
[address] [facility] [address] To Whom It May Concern: 
 
Marino Borden is currently under the care of Deana Treadwell. She will return to work/school on: Monday 2/25/2019 If there are questions or concerns please have the patient contact our office.  
 
 
 
Sincerely, 
 
 
Amanda Flood MD

## 2019-02-19 NOTE — PROGRESS NOTES
[id number] [address] [facility], [address] [place] [address] Affiliated with [facility] and [facility] Note Subjective:  
Nory Castillo is a 37 y.o. female presents for evaluation of hand pain with cold intolerance CC: \" I am not sure what is going on \" History provided by patient HPI: 
Pt states one month ago, she notice bilateral notes and hand joint pain with random bruising. She is unable to turn the keys at a work. She feels fatigued and noticed this symptom for  the past two weeks. Pt denies any chest pain, but notices her heart rate increases from time to time with palpitations. Denies SOB, chest pain,chest tightness,headahce,vision changes,dizziness,rashes. Pt denies family history of autoimmune diseases. Pt reports smoking 1/2 pack for 20 years. Current Outpatient Medications on File Prior to Visit Medication Sig Dispense Refill  cyclobenzaprine (FLEXERIL) 10 mg tablet Take 0.5 Tabs by mouth three (3) times daily as needed for Muscle Spasm(s). 30 Tab 1 No current facility-administered medications on file prior to visit. History reviewed. No pertinent past medical history. Social History Socioeconomic History  Marital status:  Spouse name: Not on file  Number of children: Not on file  Years of education: Not on file  Highest education level: Not on file Social Needs  Financial resource strain: Not on file  Food insecurity - worry: Not on file  Food insecurity - inability: Not on file  Transportation needs - medical: Not on file  Transportation needs - non-medical: Not on file Occupational History  Not on file Tobacco Use  Smoking status: Current Some Day Smoker  Smokeless tobacco: Never Used Substance and Sexual Activity  Alcohol use: No  
 Drug use: Not on file  Sexual activity: Not on file Other Topics Concern  Not on file Social History Narrative  Not on file Review of Systems Constitutional: Negative for chills and fever. Respiratory: Negative for cough and hemoptysis. Cardiovascular: Negative for chest pain. Gastrointestinal: Negative for abdominal pain, nausea and vomiting. Genitourinary: Negative for dysuria. Musculoskeletal: Positive for joint pain and myalgias. Negative for back pain and neck pain. Skin: Negative for itching and rash. Neurological: Negative for dizziness, tingling, sensory change, focal weakness and headaches. Endo/Heme/Allergies:  
     Cold fingers Objective:  
 
Visit Vitals /84 (BP 1 Location: Left arm, BP Patient Position: Sitting) Pulse (!) 126 Temp 98.3 °F (36.8 °C) (Oral) Resp 16 Ht 5' 1\" (1.549 m) Wt 148 lb (67.1 kg) SpO2 96% BMI 27.96 kg/m² Physical Exam: 
Physical Exam  
Constitutional: She is oriented to person, place, and time. She appears well-developed and well-nourished. HENT:  
Head: Normocephalic and atraumatic. Eyes: Conjunctivae are normal. Pupils are equal, round, and reactive to light. Right eye exhibits no discharge. Left eye exhibits no discharge. No scleral icterus. Neck: Normal range of motion. Neck supple. Cardiovascular: Regular rhythm, normal heart sounds and intact distal pulses. Exam reveals no gallop and no friction rub. No murmur heard. tachycardic Pulmonary/Chest: Effort normal and breath sounds normal. No respiratory distress. She has no wheezes. She has no rales. She exhibits no tenderness. Abdominal: Soft. Bowel sounds are normal.  
Musculoskeletal: She exhibits no edema, tenderness or deformity. Joint pain : at the DIP and PIP joints bilaterally, no swelling, capillary refills <3 secs. Fingers are cold to touch. ROM intact, Strength intact No lower extremity swelling Neurological: She is alert and oriented to person, place, and time. No cranial nerve deficit. Skin: Skin is warm and dry. No rash noted. No erythema. Nursing note and vitals reviewed. EKG :P :104,sinus tachycardia, no ST changes, QTc 407 Assessment and orders: ICD-10-CM ICD-9-CM 1. Polydipsia R63.1 783.5 AMB POC URINALYSIS DIP STICK AUTO W/O MICRO HEMOGLOBIN A1C WITH EAG  
   TSH REFLEX TO T4  
   METABOLIC PANEL, COMPREHENSIVE  
   CBC WITH AUTOMATED DIFF AMB POC HEMOGLOBIN (HGB) 2. Cold intolerance R68.89 780.99 TSH REFLEX TO T4  
   CBC WITH AUTOMATED DIFF AMB POC HEMOGLOBIN A1C 3. Tobacco use Z72.0 305.1 4. Tachycardia R00.0 785.0 AMB POC EKG ROUTINE W/ 12 LEADS, INTER & REP  
   AMB POC EKG ROUTINE W/ 12 LEADS, INTER & REP  
   AMB POC EKG ROUTINE W/ 12 LEADS, INTER & REP 5. Pain in both hands M79.641 729.5 SUZI W/REFLEX  
 M79.642  RHEUMATOID FACTOR, QL Diagnoses and all orders for this visit: 1. Polydipsia -     AMB POC URINALYSIS DIP STICK AUTO W/O MICRO 
-     HEMOGLOBIN A1C WITH EAG 
-     TSH REFLEX TO T4 
-     METABOLIC PANEL, COMPREHENSIVE 
-     CBC WITH AUTOMATED DIFF 
-     AMB POC HEMOGLOBIN (HGB) 2. Cold intolerance 
-     TSH REFLEX TO T4 
-     CBC WITH AUTOMATED DIFF 
-     AMB POC HEMOGLOBIN A1C 3. Tobacco use Ready to quit: No 
Counseling given: Yes The patient was counseled on the dangers of tobacco use, and was advised to quit. Reviewed strategies to maximize success, including removing cigarettes and smoking materials from environment, stress management, substitution of other forms of reinforcement and support of family/friends. 4. Tachycardia -     AMB POC EKG ROUTINE W/ 12 LEADS, INTER & REP 
-     AMB POC EKG ROUTINE W/ 12 LEADS, INTER & REP 
-     AMB POC EKG ROUTINE W/ 12 LEADS, INTER & REP 5. Pain in both hands -     SUZI W/REFLEX 
-     RHEUMATOID FACTOR, QL Follow-up Disposition: 
Return in about 2 weeks (around 3/5/2019) for for follow up of multiple symptoms . I have reviewed patient medical and social history and medications.   I have reviewed pertinent labs results and other data. I have discussed the diagnosis with the patient and the intended plan as seen in the above orders. The patient has received an after-visit summary and questions were answered concerning future plans. I have discussed medication side effects and warnings with the patient as well. Diana Khan MD 
Resident [facility] 02/24/19

## 2019-02-19 NOTE — PROGRESS NOTES
1. Have you been to the ER, urgent care clinic since your last visit? Hospitalized since your last visit? no 
 
2. Have you seen or consulted any other health care providers outside of the [address] [place] [address] since your last visit? Include any pap smears or colon screening. No 
Reviewed record in preparation for visit and have obtained necessary documentation. Patient did not bring medications to visit for review. Information provided on Advanced Directive, Living Will. Body mass index is 27.96 kg/m². Health Maintenance Due Topic Date Due  
 PAP AKA CERVICAL CYTOLOGY  01/25/2004

## 2019-02-19 NOTE — PROGRESS NOTES
I reviewed with the resident the medical history and the resident's findings on the physical examination. I discussed with the resident the patient's diagnosis and concur with the plan. Pt with multiple non-specific symptoms and signs, but concerning for a more serious process. EKG just with sinus tachycardia and HR improved from initial check to EKG. Will get initial labwork and then determine how to further work up her symptoms.

## 2019-02-25 NOTE — TELEPHONE ENCOUNTER
[address]       Pt was updated on recent lab results elevated RF and Elevated WBC. All questions were answered. Pt is aware that a referral to rheum will be sent.  Pt is agreeable to current plan                Edna Bajwa MD  Family Medicine Resident  PGY 3

## 2019-02-28 NOTE — TELEPHONE ENCOUNTER
Called and spoke to patient. Advised her of referral for Hematology/Oncology and Rheumatology due to elevated WBC's . She verbalized understanding.

## 2019-03-05 NOTE — PROGRESS NOTES
Theodore Yeh is a 37 y.o. 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 Patient with no complaints of pain at this time.

## 2019-03-05 NOTE — LETTER
NOTIFICATION RETURN TO WORK 
 
3/5/2019 10:29 AM 
 
Ms. Filiberto Cool 
[address] [facility] [address] To Whom It May Concern: 
 
Filiberto Cool is currently under the care of [address] [facility]. Please excuse her from work on Tuesday, March 5, 2019. She may return to work on Wednesday, March 6, 2019. If there are questions or concerns please have the patient contact our office. Sincerely, Kely Hallman MD

## 2019-03-05 NOTE — PROGRESS NOTES
[address] [facility] at [facility]  [phone]    Hematology / Oncology Consult    Reason for Visit:   Isabela Martel is a 37 y.o. female who is seen in consultation at the request of Dr. Rodrick Mcguire and Heather Chavez for evaluation of eosinophilia. Hematology Oncology Treatment History:     Diagnosis: Pending    Stage: Pending    Pathology: Pending    Prior Treatment: None    Current Treatment: pending  Treatment duration    Frequency of visits     History of Present Illness:   Ms. Gwynneth Osler is a 36 y/o female with no significant medical history comes in for evaluation of leukocytosis, mostly eosinophils. She has been seeing her PCP for intermittent CP, associated with vomiting each time. EKG was reportedly normal. She states these symptoms have been occurring for the past 2 weeks, lasting anywhere from 15 min to 7 hrs, starts in midsternal region and spreads upward and outward, sometimes involves the back. No associated arm or shoulder pain. The pain feels like a muscle tightening. No dysphagia or SOB during these episodes. She describes a \"hard\" heart beat episode intermittently as well, which she describes as like an adrenaline rush. First thing in the morning, she has a cough productive of mucus and gel-like substance. No pruritus or rashes. She reports watery diarrhea for the past month, which depends on what she eats, sometimes 6 stools a day. No travel outside of the country. She does work in a alf. She reports 50-lb weight loss since September 2018, gets full fast or sometimes has no appetite. She states she had and was treated for ringworm in approx Sept 2018. She smokes 1/2 ppd for the past 20yrs. 1 drink per month. No IVDA. Smokes marijuana few times a week. History reviewed. No pertinent past medical history.    Past Surgical History:   Procedure Laterality Date    HX TONSIL AND ADENOIDECTOMY        Social History     Tobacco Use    Smoking status: Current Every Day Smoker     Packs/day: 0.50     Years: 20.00     Pack years: 10.00    Smokeless tobacco: Never Used   Substance Use Topics    Alcohol use: No      History reviewed. No pertinent family history. No current outpatient medications on file. No current facility-administered medications for this visit. Allergies   Allergen Reactions    Sulfur Itching        Review of Systems: A complete review of systems was obtained, negative except as described above. Physical Exam:     Visit Vitals  /81 (BP 1 Location: Left arm, BP Patient Position: Sitting)   Pulse 93   Temp 97.6 °F (36.4 °C) (Oral)   Ht 5' 1\" (1.549 m)   Wt 145 lb 9.6 oz (66 kg)   SpO2 100%   BMI 27.51 kg/m²     ECOG PS: 0  General: Well developed, no acute distress  Eyes: PERRLA, EOMI, anicteric sclerae  HENT: Atraumatic, OP clear, TMs intact without erythema  Neck: Supple  Lymphatic: No cervical, supraclavicular, axillary or inguinal adenopathy  Respiratory: CTAB, normal respiratory effort  CV: Normal rate, regular rhythm, no murmurs, no peripheral edema  GI: Soft, nontender, nondistended, no masses, no hepatomegaly, no splenomegaly  MS: Normal gait and station. Digits without clubbing or cyanosis. Skin: No rashes, ecchymoses, or petechiae. Normal temperature, turgor, and texture. Neuro/Psych: Alert, oriented. 5/5 strength in all 4 extremities. Appropriate affect, normal judgment/insight. Results:     Lab Results   Component Value Date/Time    WBC 27.3 (HH) 02/19/2019 05:09 PM    HGB 12.7 02/19/2019 05:09 PM    HCT 37.5 02/19/2019 05:09 PM    PLATELET 737 15/81/1539 05:09 PM    MCV 79 02/19/2019 05:09 PM    ABS.  NEUTROPHILS 2.3 02/19/2019 05:09 PM    Hemoglobin (POC) 11.1 02/19/2019 04:20 PM     Lab Results   Component Value Date/Time    Sodium 138 02/19/2019 05:09 PM    Potassium 3.6 02/19/2019 05:09 PM    Chloride 101 02/19/2019 05:09 PM    CO2 22 02/19/2019 05:09 PM    Glucose 81 02/19/2019 05:09 PM    BUN 3 (L) 02/19/2019 05:09 PM    Creatinine 0.87 02/19/2019 05:09 PM    GFR est AA 99 02/19/2019 05:09 PM    GFR est non-AA 86 02/19/2019 05:09 PM    Calcium 8.9 02/19/2019 05:09 PM     Lab Results   Component Value Date/Time    Bilirubin, total 1.4 (H) 02/19/2019 05:09 PM    ALT (SGPT) 35 (H) 02/19/2019 05:09 PM    AST (SGOT) 27 02/19/2019 05:09 PM    Alk. phosphatase 131 (H) 02/19/2019 05:09 PM    Protein, total 8.0 02/19/2019 05:09 PM    Albumin 3.7 02/19/2019 05:09 PM     No results found for: IRON, FE, TIBC, IBCT, PSAT, FERR    No results found for: B12LT, FOL, RBCF  Lab Results   Component Value Date/Time    TSH 1.210 02/19/2019 05:09 PM    TSH 1.290 09/12/2017 10:33 AM     No results found for: HAMAT, HAAB, HABT, HAAT, HBSAG, HBSB, HBSAT, HBABN, HBCM, HBCAB, HBCAT, Nandini Smiling, HBEAB, [address] [place] [address], [facility], [facility], [address] [facility], [address] [place] [address], [address] [place] [address], [id number], [id number], [address] [place] [address], [id number], HBCMLT, [id number], HCGAT      Imaging:     Radiology report(s) reviewed. Assessment & Plan:   Emeterio Ramirez is a 37 y.o. 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 1. Leukocytosis: Mostly eosinophils with also lymphocytes. The marked absolute eosinophils much > 1500 is concerning for hypereosinophilic syndromes such as DRESS, eosinophilic leukemia, systemic mastocytosis, etc but could also be related to injections. Low threshold for bone marrow biopsy if eosinophils remain markedly elevated on next CBC.   -- CBC, peripheral smear, LDH, JAK2, bcr/abl, VitB12, serum tryptase, strongyloides IgG  -- Cardiac troponin, CK  -- CXR, abdomen ultrasound - both have resulted as normal  -- Return in 2-3 weeks to review results    2. Chest pains: Intermittent for the past 2 weeks. EKG reportedly done by PCP and was normal.    Emotional well being: Pt is coping well with his/her disease and has excellent support. I appreciate the opportunity to participate in Ms. Candy Gilmore's care.     Signed By: Everett Rubio MD     March 5, 2019

## 2019-03-06 NOTE — DISCHARGE INSTRUCTIONS

## 2019-03-06 NOTE — TELEPHONE ENCOUNTER
[address] [facility] [place]  [phone]      03/06/19 9:48 AM Received call from [address] [facility] [address] with [facility] with the following critical results: troponin 6.85, WBC 26.5, , and folate 3.0. Performed readback as well. Informed Gaston Curry NP and Dr. Senia Moulton of above results.

## 2019-03-06 NOTE — ED NOTES
Verbal shift change report given to [address] [place] [address] (oncoming nurse) by Joe Hernandez (offgoing nurse). Report included the following information SBAR, ED Summary and MAR.

## 2019-03-06 NOTE — ED PROVIDER NOTES
37 y.o. female with no significant past medical history who presents from private vehicle with chief complaint of abnormal lab results. Pt was seen by Dr. Christiano Hdez (Hematology & Oncology) yesterday for eosinophilia. Pt reports mid sternal chest pain, accompanied by N/V yesterday. Pt's WBC was [id number] on 02/19/19. Pt denies daily medications. Pt denies SOB or fever. There are no other acute medical concerns at this time. Social hx: Denies drug use. PCP: Maico Gregorio MD    I have evaluated the patient as the Provider in Triage. I have reviewed Her vital signs and the triage nurse assessment. I have talked with the patient and any available family and advised that I am the provider in triage and have ordered the appropriate study to initiate their work up based on the clinical presentation during my assessment. I have advised that the patient will be accommodated in the Main ED as soon as possible. I have also requested to contact the triage nurse or myself immediately if the patient experiences any changes in their condition during this brief waiting period. Note written by Анна Shaver, as dictated by Muriel Currie MD 12:31 PM      Liliana Mcclure MD evaluated patient. Additional information provided:    Patient had been experiencing recurrent chest pain for the past few weeks, and was referred to hematologist/oncologist Dr. Christiano Hdez for further evaluation. Patient was seen by Dr. Christiano Hdez yesterday for recurrent chest pain, completed blood work which resulted today showing troponin 6.0 and eosinophils 18,000, and was referred to [facility] ED for rule out ACS versus endocarditis, with cardiologist Dr. Haydee Barrow aware. Patient presents to [facility] ED today via private vehicle with no current pain or discomfort. Patient endorses intermittent midsternal chest pain that radiates \"outward to her back\" described as \"tight and cramping\" that varies in duration from \"15 minutes to 6 hours. \" Patient notes the last episode of chest pain was yesterday and lasted 6 hours. Patient endorses sometimes experiencing nausea and vomiting during episodes of chest pain. Patient denies any aggravating or alleviating factors for chest pain, noting chest pain is not exertional. Patient denies taking ASA. Pt denies fever, chills, cough, congestion, shortness of breath, chest pain, abdominal pain, nausea, vomiting, diarrhea, difficulty with urination or dysuria. Note written by Анна Lee, as dictated by Adrianna Alejandre MD 12:49 PM        The history is provided by the patient and medical records (Dr. Mercedes Bo). No past medical history on file. Past Surgical History:   Procedure Laterality Date    HX TONSIL AND ADENOIDECTOMY           No family history on file. Social History     Socioeconomic History    Marital status: LEGALLY      Spouse name: Not on file    Number of children: Not on file    Years of education: Not on file    Highest education level: Not on file   Social Needs    Financial resource strain: Not on file    Food insecurity - worry: Not on file    Food insecurity - inability: Not on file    Transportation needs - medical: Not on file   Zafgen needs - non-medical: Not on file   Occupational History    Not on file   Tobacco Use    Smoking status: Current Every Day Smoker     Packs/day: 0.50     Years: 20.00     Pack years: 10.00    Smokeless tobacco: Never Used   Substance and Sexual Activity    Alcohol use: No    Drug use: No    Sexual activity: Yes   Other Topics Concern    Not on file   Social History Narrative    Not on file         ALLERGIES: Sulfur    Review of Systems   Constitutional: Negative for chills and fever. HENT: Negative for congestion. Respiratory: Negative for cough, chest tightness and shortness of breath. Cardiovascular: Negative for chest pain, palpitations and leg swelling.    Gastrointestinal: Negative for abdominal pain, diarrhea, nausea and vomiting. Genitourinary: Negative for difficulty urinating and dysuria. Musculoskeletal: Negative for back pain. Skin: Negative for rash. All other systems reviewed and are negative. There were no vitals filed for this visit. Physical Exam   Constitutional: She is oriented to person, place, and time. She appears well-developed and well-nourished. No distress. HENT:   Head: Normocephalic and atraumatic. Eyes: Conjunctivae are normal. No scleral icterus. Neck: Neck supple. No tracheal deviation present. Cardiovascular: Regular rhythm, normal heart sounds and intact distal pulses. Tachycardia present. Exam reveals no gallop and no friction rub. No murmur heard. Pulmonary/Chest: Effort normal and breath sounds normal. She has no wheezes. She has no rales. Abdominal: Soft. She exhibits no distension. There is no tenderness. There is no rebound and no guarding. Musculoskeletal: She exhibits no edema. Neurological: She is alert and oriented to person, place, and time. Skin: Skin is warm and dry. No rash noted. Psychiatric: She has a normal mood and affect. Nursing note and vitals reviewed. Note written by Анна Garay, as dictated by Jeny Berrios MD 12:49 PM       MDM       Procedures  ED EKG interpretation:  Rhythm: sinus tachycardia; and regular . Rate (approx.): 104 bpm; ST/T wave: non-specific changes. Note written by Анна Garay, as dictated by Jeny Berrios MD 12:49 PM    CONSULT NOTE:  2:22 PM Jeny Berrios MD spoke with Dr. Fabrice Porter for Cardiology. Discussed available diagnostic tests and clinical findings. Dr. Cory Palacios states ECHO was normal, and plans to do stress test today. If stress test today is normal then comfortable with discharge.     A/P: intermittent CP - none today; referred by hematology (seeing her osman leukocytosis/eosinophilia) for episodic CP and a trop of 6 (from yesterday); pt currently symptom free and looks well; Dr. Susan Davila (cards) has seen and states her echo today is reassuring; he has ordered a stress test for today; his plan is if it is normal, she will go home for further outpatient f/u.   Maribel Mcconnell MD  3:40 PM

## 2019-03-06 NOTE — TELEPHONE ENCOUNTER
03/06/19 10:17 AM Verified patient ID. Notified patient she needs to go to the ED due to abnormal lab results, specifically due to her elevated troponin level. Notified patient she needs to go to [facility] emergency department, where she will be evaluated by a cardiologist. She denies acute chest pain, SOB or n/v at this time. She verbalizes understanding and will go to ED immediately. No further questions at this time.

## 2019-03-06 NOTE — CONSULTS
Reason for Consult: Chest pain, Positive Troponin. HPI: Grace Jaramillo is a 37 y.o. female with no significant past medical history is here for evaluation of symptoms of chest pain as well as positive troponin. She was seen by Dr. Sushil Lambert yesterday for evaluation of elevated WBC count which was noted by her PCP a few days ago on February 19, 2019. Her white count was 27,300 with 69% eosinophils. Dr. Sushil Lambert had sent out further lab work yesterday of which troponin was significantly elevated at 6.0. She is also having symptoms of chest pain from last 1 month which are off and on aching in nature mild to moderate in intensity gradually builds of starting from the anterior retrosternal region nonradiating not associated with any symptoms of shortness of breath, lightheadedness, dizziness, presyncope or syncope. She also has been having significant muscle weakness to the point that she is not able to shower by herself without having to take a 10-minute break in between. She feels like she has had flulike illness however she has not suffered any flu or fever. She does not have any prior history of CAD or MI. She was seen by Dr. Saleem Sweeney 2-3 years ago for an episode of tachycardia and elevated blood pressure and was placed on metoprolol which she took for a few days and thereafter stopped taking the medication. Currently she is not taking any medications at home. There is no over-the-counter medication. She smokes about half pack per day. She uses recreational marijuana. EKG at the time of presentation demonstrated normal sinus rhythm with sinus tachycardia heart rate of 106 bpm with normal ST segment. Nonspecific T wave in 1-lead III. Normal axis, normal intervals. Plan:    1. Chest pain/troponin positive: Unlikely to be CAD related or acute coronary syndrome however needs to rule out. Likely related to be high eosinophil count possibly causing Buffy's endocarditis.   We will perform a troponin in the ER to see at current levels. Currently not having chest pain and EKG is normal.  We will get an echocardiogram and if the EF is normal then will do a stress test only to rule out CAD which I think the pretest probability is very low. 2. Tachycardia: This could be related to underlying possible Loefflers endocarditis. Further evaluation underway to rule out. Addendum:    - Echo show moderate MR  - Stress Echo was negative for ischemia.   - OK to DC home. - Will see her as OP and will do OP Cardiac MRI to see if there is any cardiac sequelae of Eosinophilia and r/o EMF. No past medical history on file. Past Surgical History:   Procedure Laterality Date    HX TONSIL AND ADENOIDECTOMY               No family history on file. Social History     Socioeconomic History    Marital status: LEGALLY      Spouse name: Not on file    Number of children: Not on file    Years of education: Not on file    Highest education level: Not on file   Social Needs    Financial resource strain: Not on file    Food insecurity - worry: Not on file    Food insecurity - inability: Not on file    Transportation needs - medical: Not on file   [employer] needs - non-medical: Not on file   Occupational History    Not on file   Tobacco Use    Smoking status: Current Every Day Smoker     Packs/day: 0.50     Years: 20.00     Pack years: 10.00    Smokeless tobacco: Never Used   Substance and Sexual Activity    Alcohol use: No    Drug use: No    Sexual activity: Yes   Other Topics Concern    Not on file   Social History Narrative    Not on file         Allergies   Allergen Reactions    Sulfur Itching                 ROS:  12 point review of systems was performed.  All negative except for HPI     Physical Exam:  Visit Vitals  /80 (BP 1 Location: Right arm, BP Patient Position: At rest)   Pulse (!) 114   Temp 99.1 °F (37.3 °C)   Resp 14   Wt 148 lb (67.1 kg)   LMP 02/12/2019   SpO2 96%   BMI 27.96 kg/m²       Gen:  Well-developed, well-nourished, in no acute distress  HEENT:  Pink conjunctivae, PERRL, hearing intact to voice, moist mucous membranes  Neck:  Supple, without masses, thyroid non-tender  Resp:  No accessory muscle use, clear breath sounds without wheezes rales or rhonchi  Card:  No murmurs, normal S1, S2 without thrills, bruits or peripheral edema  Abd:  Soft, non-tender, non-distended, normoactive bowel sounds are present, no palpable organomegaly and no detectable hernias  Lymph:  No cervical or inguinal adenopathy  Musc:  No cyanosis or clubbing  Skin:  No rashes or ulcers, skin turgor is good  Neuro:  Cranial nerves are grossly intact, no focal motor weakness, follows commands appropriately  Psych:  Good insight, oriented to person, place and time, alert     Labs:     Lab Results   Component Value Date/Time    WBC 33.4 (H) 03/06/2019 01:00 PM    HGB 12.2 03/06/2019 01:00 PM    Hemoglobin (POC) 11.1 02/19/2019 04:20 PM    HCT 36.8 03/06/2019 01:00 PM    PLATELET 553 17/34/9501 01:00 PM    MCV 81.4 03/06/2019 01:00 PM     Lab Results   Component Value Date/Time    Hemoglobin A1c <4.2 (L) 02/19/2019 05:09 PM    Hemoglobin A1c 5.4 09/12/2017 10:33 AM    Glucose 81 02/19/2019 05:09 PM    LDL, calculated 60 09/12/2017 10:33 AM    Creatinine 0.87 02/19/2019 05:09 PM      Lab Results   Component Value Date/Time    Cholesterol, total 117 09/12/2017 10:33 AM    HDL Cholesterol 37 (L) 09/12/2017 10:33 AM    LDL, calculated 60 09/12/2017 10:33 AM    Triglyceride 101 09/12/2017 10:33 AM     Lab Results   Component Value Date/Time    ALT (SGPT) 35 (H) 02/19/2019 05:09 PM    AST (SGOT) 27 02/19/2019 05:09 PM    Alk.  phosphatase 131 (H) 02/19/2019 05:09 PM    Bilirubin, total 1.4 (H) 02/19/2019 05:09 PM    Albumin 3.7 02/19/2019 05:09 PM    Protein, total 8.0 02/19/2019 05:09 PM    PLATELET 502 01/65/4071 01:00 PM     No results found for: INR, PTMR, PTP, PT1, PT2   Lab Results   Component Value Date/Time    GFR est non-AA 86 02/19/2019 05:09 PM    GFR est AA 99 02/19/2019 05:09 PM    Creatinine 0.87 02/19/2019 05:09 PM    BUN 3 (L) 02/19/2019 05:09 PM    Sodium 138 02/19/2019 05:09 PM    Potassium 3.6 02/19/2019 05:09 PM    Chloride 101 02/19/2019 05:09 PM    CO2 22 02/19/2019 05:09 PM     No results found for: PSA, PSA2, PSAR1, Magdalene Finch, PSAR3, [id number], [id number], PSALT  Lab Results   Component Value Date/Time    TSH 1.210 02/19/2019 05:09 PM    TSH 1.290 09/12/2017 10:33 AM    T4, Free 0.95 09/12/2017 10:33 AM      Lab Results   Component Value Date/Time    Glucose 81 02/19/2019 05:09 PM      No results found for: CPK, RCK1, RCK2, RCK3, RCK4, CKMB, CKNDX, CKND1, TROPT, TROIQ, BNPP, BNP   No results found for: BNP, BNPP, BNPPPOC, XBNPT, BNPNT   Lab Results   Component Value Date/Time    Sodium 138 02/19/2019 05:09 PM    Potassium 3.6 02/19/2019 05:09 PM    Chloride 101 02/19/2019 05:09 PM    CO2 22 02/19/2019 05:09 PM    Glucose 81 02/19/2019 05:09 PM    BUN 3 (L) 02/19/2019 05:09 PM    Creatinine 0.87 02/19/2019 05:09 PM    BUN/Creatinine ratio 3 (L) 02/19/2019 05:09 PM    GFR est AA 99 02/19/2019 05:09 PM    GFR est non-AA 86 02/19/2019 05:09 PM    Calcium 8.9 02/19/2019 05:09 PM      Lab Results   Component Value Date/Time    Sodium 138 02/19/2019 05:09 PM    Potassium 3.6 02/19/2019 05:09 PM    Chloride 101 02/19/2019 05:09 PM    CO2 22 02/19/2019 05:09 PM    Glucose 81 02/19/2019 05:09 PM    BUN 3 (L) 02/19/2019 05:09 PM    Creatinine 0.87 02/19/2019 05:09 PM    BUN/Creatinine ratio 3 (L) 02/19/2019 05:09 PM    GFR est AA 99 02/19/2019 05:09 PM    GFR est non-AA 86 02/19/2019 05:09 PM    Calcium 8.9 02/19/2019 05:09 PM    Bilirubin, total 1.4 (H) 02/19/2019 05:09 PM    ALT (SGPT) 35 (H) 02/19/2019 05:09 PM    AST (SGOT) 27 02/19/2019 05:09 PM    Alk.  phosphatase 131 (H) 02/19/2019 05:09 PM    Protein, total 8.0 02/19/2019 05:09 PM    Albumin 3.7 02/19/2019 05:09 PM    A-G Ratio 0.9 (L) 02/19/2019 05:09 PM      Lab Results   Component Value Date/Time    Hemoglobin A1c <4.2 (L) 02/19/2019 05:09 PM    Hemoglobin A1c (POC) 5.3 02/19/2019 04:30 PM         No results for input(s): CPK, CKMB, TROIQ in the last 72 hours. No lab exists for component: CKQMB, CPKMB        Problem List:     Problem List  Date Reviewed: 3/5/2019          Codes Class Noted    Tobacco use (Chronic) ICD-10-CM: Z72.0  ICD-9-CM: 305.1  12/11/2018                Sebastián Drake MD, [facility]

## 2019-03-06 NOTE — LETTER
[address] 
[facility] EMERGENCY DEPT 
[address] [facility] [address] [address] 
[phone] Work/School Note Date: 3/6/2019 To Whom It May concern: 
 
Emeterio Ramirez was seen and treated today in the emergency room by the following provider(s): 
No providers found. Emeterio Ramirez may return to work on 3/9/19. Sincerely, Daniel Sánchez, RN

## 2019-03-13 NOTE — PROGRESS NOTES
Your folic acid level was quite low and I recommend you start taking folic acid supplements. (Please send to her pharmacy).

## 2019-03-15 NOTE — PROGRESS NOTES
[id number] [address] Residency Program,    [address]   Affiliated with [place] and [facility] Note   Subjective:   Kiana Lieberman is a 37 y.o. female presents for evaluation of proximal muscle weakness   CC: \"My legs tire out fast\"  History provided by patient     HPI:    Pt states her legs continues to give out and is now feeling constant fatigue. She states she noticed when she begins to walk her legs feel really tired and heavy,this is a new finding since her initial appointment 2/19. Pt initially experiencing hand and generalized joint pain for the past month with chest palpitations. EKG at that time was normal. Follow up labs for symptoms were concerning for elevated WBC with eosinophilia with positive RF. Pt referred to heme onc, where pt was found to have an elevated troponin of 3.54 due to acute onset of chest pain during the office visit. Pt was sen to the [facility] ED for symptoms. Cardiologist, Dr. Luzmaria Rosa was consulted. Echo was normal , stress test was normal and pt was discharged home on the same day. Pt is schedule for a Cardiac MRI next month and has follow up with Heme onc on 3/21. Pt has not seen Rheumatology yet however, she has an appointment on 4/10. She has had a 20lb unintentional weight loss. Current Outpatient Medications on File Prior to Visit   Medication Sig Dispense Refill    folic acid (FOLVITE) 1 mg tablet Take 1 Tab by mouth daily. 30 Tab 0     No current facility-administered medications on file prior to visit. History reviewed. No pertinent past medical history.     Social History     Socioeconomic History    Marital status: LEGALLY      Spouse name: Not on file    Number of children: Not on file    Years of education: Not on file    Highest education level: Not on file   Social Needs    Financial resource strain: Not on file    Food insecurity - worry: Not on file    Food insecurity - inability: Not on file   [employer] needs - medical: Not on file    Transportation needs - non-medical: Not on file   Occupational History    Not on file   Tobacco Use    Smoking status: Current Every Day Smoker     Packs/day: 0.50     Years: 20.00     Pack years: 10.00    Smokeless tobacco: Never Used   Substance and Sexual Activity    Alcohol use: No    Drug use: No    Sexual activity: Yes   Other Topics Concern    Not on file   Social History Narrative    Not on file       Review of Systems   Constitutional: Positive for malaise/fatigue. Negative for chills and fever. Eyes: Negative for double vision and photophobia. Respiratory: Negative for cough. Cardiovascular: Negative for chest pain, palpitations and leg swelling. Gastrointestinal: Negative for nausea and vomiting. Musculoskeletal: Positive for myalgias. Negative for back pain, falls and neck pain. Skin: Negative for itching and rash. Neurological: Positive for weakness. Negative for dizziness, tingling, sensory change, speech change, focal weakness and headaches. Psychiatric/Behavioral: Negative for hallucinations, substance abuse and suicidal ideas. The patient is not nervous/anxious. Objective:     Visit Vitals  /72 (BP 1 Location: Right arm, BP Patient Position: Sitting)   Pulse (!) 117   Temp 98.3 °F (36.8 °C) (Oral)   Resp 16   Ht 5' 1\" (1.549 m)   Wt 143 lb (64.9 kg)   SpO2 99%   BMI 27.02 kg/m²      Physical Exam:  Physical Exam   Constitutional: She is oriented to person, place, and time. She appears well-developed and well-nourished. HENT:   Head: Normocephalic and atraumatic. Eyes: Conjunctivae are normal. Pupils are equal, round, and reactive to light. Right eye exhibits no discharge. Left eye exhibits no discharge. No scleral icterus. Neck: Normal range of motion. Neck supple. Cardiovascular: Normal rate, regular rhythm, normal heart sounds and intact distal pulses. Exam reveals no gallop and no friction rub.    No murmur heard.  Pulmonary/Chest: Effort normal and breath sounds normal. No respiratory distress. She has no wheezes. She has no rales. She exhibits no tenderness. Abdominal: Soft. Bowel sounds are normal. There is no tenderness. Musculoskeletal: Normal range of motion. She exhibits no edema, tenderness or deformity. strength 5/5 for UE and LE . Sensation intact . Neurological: She is alert and oriented to person, place, and time. No cranial nerve deficit. Skin: Skin is warm and dry. No rash noted. No erythema. Nursing note and vitals reviewed. Assessment and orders:     Pt is a 36 yo female who presents for ED follow up for chest pain and elevated trop. Work up was negative. Pt now with symptoms generalized fatigue and lower and upper extremity weakness . Physical exam findings were unremarkable. Will  await assessment from [facility] for fluctuating symptoms of joint pain and now muscle weakness. ICD-10-CM ICD-9-CM    1. Fatigue, unspecified type R53.83 780.79    2. Weakness of both legs R29.898 729.89      Diagnoses and all orders for this visit:    1. Fatigue, unspecified type    2. Weakness of both legs    Pt to follow up with Rheumatology, Heme Onc, and Cardiology. Follow-up Disposition:  Return in about 4 weeks (around 4/12/2019) for please schedule an appointment at [facility] for PAP. I have reviewed patient medical and social history and medications. I have reviewed pertinent labs results and other data. I have discussed the diagnosis with the patient and the intended plan as seen in the above orders. The patient has received an after-visit summary and questions were answered concerning future plans. I have discussed medication side effects and warnings with the patient as well.     Dayana Castellon MD  Resident [facility]  03/15/19

## 2019-03-15 NOTE — PROGRESS NOTES
1. Have you been to the ER, urgent care clinic since your last visit? Hospitalized since your last visit? No    2. Have you seen or consulted any other health care providers outside of the [address] [place] [address] since your last visit? Include any pap smears or colon screening.  No  Reviewed record in preparation for visit and have necessary documentation  Pt did not bring medication to office visit for review    Goals that were addressed and/or need to be completed during or after this appointment include   Health Maintenance Due   Topic Date Due    PAP AKA CERVICAL CYTOLOGY  01/25/2004

## 2019-03-15 NOTE — LETTER
NOTIFICATION RETURN TO WORK / SCHOOL 
 
3/15/2019 11:55 AM 
 
Ms. Artem Reyes 
[address] [facility] [address] To Whom It May Concern: 
 
Artem Reyes is currently under the care of Deana Treadwell. If there are questions or concerns please have the patient contact our office.  
 
 
 
Sincerely, 
 
 
Scooter Cummings MD

## 2019-03-21 NOTE — PROGRESS NOTES
[facility] Medical Oncology at [facility] -[phone] Hematology / Oncology Consult Reason for Visit:  
Filiberto Cool is a 37 y.o. female who is seen in consultation at the request of Dr. Ranjith Soria and Dimas Goldmann for evaluation of eosinophilia. Hematology Oncology Treatment History:  
 
Diagnosis: Pending Stage: Pending Pathology: Pending Prior Treatment: None Current Treatment: pending Treatment duration Frequency of visits History of Present Illness: Ms. Keith Ford is a 38 y/o female with no significant medical history comes in for evaluation of leukocytosis, mostly eosinophils. She has been seeing her PCP for intermittent CP, associated with vomiting each time. EKG was reportedly normal. She states these symptoms have been occurring for the past 2 weeks, lasting anywhere from 15 min to 7 hrs, starts in midsternal region and spreads upward and outward, sometimes involves the back. No associated arm or shoulder pain. The pain feels like a muscle tightening. No dysphagia or SOB during these episodes. She describes a \"hard\" heart beat episode intermittently as well, which she describes as like an adrenaline rush. First thing in the morning, she has a cough productive of mucus and gel-like substance. No pruritus or rashes. She reports watery diarrhea for the past month, which depends on what she eats, sometimes 6 stools a day. No travel outside of the country. 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 She reports 50-lb weight loss since September 2018, gets full fast or sometimes has no appetite. She states she had and was treated for ringworm in approx Sept 2018. She smokes 1/2 ppd for the past 20yrs. 1 drink per month. No IVDA. Smokes marijuana few times a week. Interval History: Today, patient comes in for follow up of her leukocytosis.  Cardiac enzymes resulted in an elevated troponin (3.54) and CK. Patient directed to go the emergency department. She was evaluated by cardiology, workup included stress test which was normal and echocardiogram.  
 
History reviewed. No pertinent past medical history. Past Surgical History:  
Procedure Laterality Date  HX TONSIL AND ADENOIDECTOMY Social History Tobacco Use  Smoking status: Current Every Day Smoker Packs/day: 0.50 Years: 20.00 Pack years: 10.00  Smokeless tobacco: Never Used Substance Use Topics  Alcohol use: No  
  
History reviewed. No pertinent family history. Current Outpatient Medications Medication Sig  
 folic acid (FOLVITE) 1 mg tablet Take 1 Tab by mouth daily. No current facility-administered medications for this visit. Allergies Allergen Reactions  Sulfur Itching Review of Systems: A complete review of systems was obtained, negative except as described above. Physical Exam:  
 
There were no vitals taken for this visit. ECOG PS: 0 General: Well developed, no acute distress Eyes: PERRLA, EOMI, anicteric sclerae HENT: Atraumatic, OP clear, TMs intact without erythema Neck: Supple Lymphatic: No cervical, supraclavicular, axillary or inguinal adenopathy Respiratory: CTAB, normal respiratory effort CV: Normal rate, regular rhythm, no murmurs, no peripheral edema GI: Soft, nontender, nondistended, no masses, no hepatomegaly, no splenomegaly MS: Normal gait and station. Digits without clubbing or cyanosis. Skin: No rashes, ecchymoses, or petechiae. Normal temperature, turgor, and texture. Neuro/Psych: Alert, oriented. 5/5 strength in all 4 extremities. Appropriate affect, normal judgment/insight. Results:  
 
Lab Results Component Value Date/Time WBC 33.4 (H) 03/06/2019 01:00 PM  
 HGB 12.2 03/06/2019 01:00 PM  
 HCT 36.8 03/06/2019 01:00 PM  
 PLATELET 877 72/58/8695 01:00 PM  
 MCV 81.4 03/06/2019 01:00 PM  
 ABS.  NEUTROPHILS 0.7 (L) 03/06/2019 01:00 PM  
 Hemoglobin (POC) 11.1 02/19/2019 04:20 PM  
 
Lab Results Component Value Date/Time Sodium 138 03/06/2019 01:00 PM  
 Potassium 3.2 (L) 03/06/2019 01:00 PM  
 Chloride 103 03/06/2019 01:00 PM  
 CO2 27 03/06/2019 01:00 PM  
 Glucose 84 03/06/2019 01:00 PM  
 BUN 5 (L) 03/06/2019 01:00 PM  
 Creatinine 1.06 (H) 03/06/2019 01:00 PM  
 GFR est AA >60 03/06/2019 01:00 PM  
 GFR est non-AA 59 (L) 03/06/2019 01:00 PM  
 Calcium 8.1 (L) 03/06/2019 01:00 PM  
 
Lab Results Component Value Date/Time Bilirubin, total 1.0 03/06/2019 01:00 PM  
 ALT (SGPT) 44 03/06/2019 01:00 PM  
 AST (SGOT) 34 03/06/2019 01:00 PM  
 Alk. phosphatase 119 (H) 03/06/2019 01:00 PM  
 Protein, total 7.7 03/06/2019 01:00 PM  
 Albumin 2.6 (L) 03/06/2019 01:00 PM  
 Globulin 5.1 (H) 03/06/2019 01:00 PM  
 
No results found for: IRON, FE, TIBC, IBCT, PSAT, FERR Lab Results Component Value Date/Time Vitamin B12 1,162 03/05/2019 12:52 PM  
 Folate <1.5 (L) 03/05/2019 12:52 PM  
 
Lab Results Component Value Date/Time TSH 1.210 02/19/2019 05:09 PM  
 TSH 1.290 09/12/2017 10:33 AM  
 
No results found for: HAMAT, HAAB, HABT, HAAT, HBSAG, HBSB, HBSAT, HBABN, HBCM, HBCAB, HBCAT, XBCABS, HBEAB, HBEAG, XHEPCS, [id number], [address] [facility], [facility], [facility], [address] [facility], [id number], [id number], [address] [place] [address], [id number], [facility], [id number], HCGAT Imaging:  
 
Radiology report(s) reviewed. Assessment & Plan:  
Theodore Yeh is a 37 y.o. 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 1. Leukocytosis: Mostly eosinophils with also lymphocytes. The marked absolute eosinophils much > 1500 is concerning for hypereosinophilic syndromes such as DRESS, eosinophilic leukemia, systemic mastocytosis, etc but could also be related to injections.  Low threshold for bone marrow biopsy if eosinophils remain markedly elevated on next CBC.  
-- CBC, peripheral smear, LDH, JAK2, bcr/abl, VitB12, serum tryptase, strongyloides IgG 
-- Cardiac troponin, CK 
 -- CXR, abdomen ultrasound - both have resulted as normal 
-- Return in 2-3 weeks to review results 2. Chest pains: Intermittent for the past 2 weeks. EKG reportedly done by PCP and was normal. 
 
Emotional well being: Pt is coping well with his/her disease and has excellent support. I appreciate the opportunity to participate in Ms. Park Gilmore's care. Signed By: Amy Reeves NP March 21, 2019

## 2019-03-21 NOTE — PROGRESS NOTES
Office Follow-up    NAME: Modesto Danielson   :  1985  MRM:  [id number]    Date:  3/21/2019            Assessment:     Problem List  Date Reviewed: 3/21/2019          Codes Class Noted    Tobacco use (Chronic) ICD-10-CM: Z72.0  ICD-9-CM: 305.1  2018                 Plan:     1. Chest pain with positive troponin: This is in the background of hypereosinophilia. The stress test was normal.  The LV function is normal.  She does have moderate or maybe moderate to severe mitral regurgitation. There is no clear etiology of her positive troponin and chest pain which is atypical in nature. Further evaluation with cardiac MRI to rule out endomyocardial fibrosis or myocardial scar. Meanwhile I will start her on baby aspirin 81 mg p.o. daily. 2. Follow-up after the cardiac MRI. Subjective:     Modesto Danielson, a 37y.o. year-old who presents for followup. I had seen her on 2019 in the ER when she was sent over from Dr. Samina Del Cid office because of elevated troponin and symptoms of chest pain. Of note she has significantly elevated eosinophil count of 69% out of 27,300 total WBC count. Troponin peak was 6.0 and had trended down when we had seen her in the ER. Her echocardiogram demonstrated normal LV function. There was no LV thickening or endocardial brightness to suggest endomyocardial fibrosis. She also underwent a stress test which demonstrated normal stress imaging. Of note she had mitral regurgitation which on visual estimation appears to be moderate in intensity. She is returning today for follow-up. She is awaiting a cardiac MRI. She is scheduled to undergo bone marrow biopsy. She is will be seeing Dr. Saleem Blanchard for follow-up as well.   Exam:     Physical Exam:  Visit Vitals  /72 (BP 1 Location: Right arm, BP Patient Position: Sitting)   Pulse (!) 110   Ht 5' 1\" (1.549 m)   Wt 147 lb 3.2 oz (66.8 kg)   SpO2 98%   BMI 27.81 kg/m²     General appearance - alert, well appearing, and in no distress  Mental status - affect appropriate to mood  Eyes - sclera anicteric, moist mucous membranes  Neck - supple, no significant adenopathy  Chest - clear to auscultation, no wheezes, rales or rhonchi  Heart - normal rate, regular rhythm, normal S1, S2, no murmurs, rubs, clicks or gallops  Abdomen - soft, nontender, nondistended, no masses or organomegaly  Extremities - peripheral pulses normal, no pedal edema  Skin - normal coloration  no rashes    Medications:     Current Outpatient Medications   Medication Sig    folic acid (FOLVITE) 1 mg tablet Take 1 Tab by mouth daily. No current facility-administered medications for this visit. Diagnostic Data Review:       3/6/19 ECHO: LVEF 55% Visually MR jet appears to be moderate,howevere there appears to be reversal of flow into pulmonary veins consistent with severe regurgitation. Jet is posteriorly directed. Lab Review:     Lab Results   Component Value Date/Time    Cholesterol, total 117 09/12/2017 10:33 AM    HDL Cholesterol 37 (L) 09/12/2017 10:33 AM    LDL, calculated 60 09/12/2017 10:33 AM    Triglyceride 101 09/12/2017 10:33 AM     Lab Results   Component Value Date/Time    Creatinine 1.06 (H) 03/06/2019 01:00 PM     Lab Results   Component Value Date/Time    BUN 5 (L) 03/06/2019 01:00 PM     Lab Results   Component Value Date/Time    Potassium 3.2 (L) 03/06/2019 01:00 PM     Lab Results   Component Value Date/Time    Hemoglobin A1c <4.2 (L) 02/19/2019 05:09 PM     Lab Results   Component Value Date/Time    Hemoglobin (POC) 11.1 02/19/2019 04:20 PM    HGB 12.2 03/06/2019 01:00 PM     Lab Results   Component Value Date/Time    PLATELET 321 98/08/3245 01:00 PM     No results for input(s): CPK, CKMB, TROIQ in the last 72 hours. No lab exists for component: CKQMB, CPKMB             ___________________________________________________    Marisol Arzate.  [address] MD, [facility]

## 2019-03-21 NOTE — TELEPHONE ENCOUNTER
Lyly Veras called from Dr. Vilma Kaiser's office requesting new pt appointment with Dr. Kt Washington for evaluation of eosinophilia and lymphocytosis. See office notes and referral in [facility] and please call to advise if Dr. Kt Washington can see pt.  Ldm

## 2019-03-21 NOTE — PROGRESS NOTES
[id number] [facility], [facility] at [address] [place] [address]  [phone]    Hematology / Oncology Established Visit    Reason for Visit:   Domingo Sebastian is a 37 y.o. female who comes in for f/u eosinophilia. History of Present Illness:   Ms. Elizabeth Paz is a 36 y/o female with no significant medical history comes in for follow up of leukocytosis, mostly eosinophils. She has been seeing her PCP for intermittent CP, associated with vomiting each time. EKG was reportedly normal. She states these symptoms have been occurring for the past 2 weeks, lasting anywhere from 15 min to 7 hrs, starts in midsternal region and spreads upward and outward, sometimes involves the back. No associated arm or shoulder pain. The pain feels like a muscle tightening. No dysphagia or SOB during these episodes. She describes a \"hard\" heart beat episode intermittently as well, which she describes as like an adrenaline rush. First thing in the morning, she has a cough productive of mucus and gel-like substance. No pruritus or rashes. She reports watery diarrhea for the past month, which depends on what she eats, sometimes 6 stools a day. No travel outside of the country. She does work in a alf. She reports 50-lb weight loss since September 2018, gets full fast or sometimes has no appetite. She states she had and was treated for ringworm in approx Sept 2018. She smokes 1/2 ppd for the past 20yrs. 1 drink per month. No IVDA. Smokes marijuana few times a week. Since the last visit, she had been evaluated by Cardiology in ER due to elevated troponin of 6. She underwent stress echo on 3/6/19 which was negative for ischemia. However, she states she didn't finish the stress test due to generalized and leg weakness. No new medications were started. Her chest pain episode last occurred a week ago and lasted for approx 26 hrs. She is scheduled to see Cardiology today. She is scheduled for cardiac MRI.  She states she also feels extremely tired after a shower. She states she has shortness of breath at times and feels better after smoking marijuana. She continues to have 3-6 stools per day. History reviewed. No pertinent past medical history. Past Surgical History:   Procedure Laterality Date    HX TONSIL AND ADENOIDECTOMY        Social History     Tobacco Use    Smoking status: Current Every Day Smoker     Packs/day: 0.50     Years: 20.00     Pack years: 10.00    Smokeless tobacco: Never Used   Substance Use Topics    Alcohol use: No      History reviewed. No pertinent family history. Current Outpatient Medications   Medication Sig    folic acid (FOLVITE) 1 mg tablet Take 1 Tab by mouth daily. No current facility-administered medications for this visit. Allergies   Allergen Reactions    Sulfur Itching        Review of Systems: A complete review of systems was obtained, negative except as described above. Physical Exam:     Visit Vitals  /76 (BP 1 Location: Right arm, BP Patient Position: Sitting)   Pulse (!) 114   Temp 97.3 °F (36.3 °C) (Oral)   Ht 5' 1\" (1.549 m)   Wt 147 lb 3.2 oz (66.8 kg)   SpO2 99%   BMI 27.81 kg/m²     ECOG PS: 0  General: Well developed, no acute distress  Eyes: PERRLA, EOMI, anicteric sclerae  HENT: Atraumatic, OP clear, TMs intact without erythema  Neck: Supple  Lymphatic: No cervical, supraclavicular, axillary or inguinal adenopathy  Respiratory: CTAB, normal respiratory effort  CV: Normal rate, regular rhythm, no murmurs, no peripheral edema  GI: Soft, nontender, nondistended, no masses, no hepatomegaly, no splenomegaly  MS: Normal gait and station. Digits without clubbing or cyanosis. Skin: No rashes, ecchymoses, or petechiae. Normal temperature, turgor, and texture. Neuro/Psych: Alert, oriented. 5/5 strength in all 4 extremities. Appropriate affect, normal judgment/insight.     Results:     Lab Results   Component Value Date/Time    WBC 33.4 (H) 03/06/2019 01:00 PM    HGB 12.2 03/06/2019 01:00 PM    HCT 36.8 03/06/2019 01:00 PM    PLATELET 046 54/30/5337 01:00 PM    MCV 81.4 03/06/2019 01:00 PM    ABS. NEUTROPHILS 0.7 (L) 03/06/2019 01:00 PM    Hemoglobin (POC) 11.1 02/19/2019 04:20 PM     Lab Results   Component Value Date/Time    Sodium 138 03/06/2019 01:00 PM    Potassium 3.2 (L) 03/06/2019 01:00 PM    Chloride 103 03/06/2019 01:00 PM    CO2 27 03/06/2019 01:00 PM    Glucose 84 03/06/2019 01:00 PM    BUN 5 (L) 03/06/2019 01:00 PM    Creatinine 1.06 (H) 03/06/2019 01:00 PM    GFR est AA >60 03/06/2019 01:00 PM    GFR est non-AA 59 (L) 03/06/2019 01:00 PM    Calcium 8.1 (L) 03/06/2019 01:00 PM     Lab Results   Component Value Date/Time    Bilirubin, total 1.0 03/06/2019 01:00 PM    ALT (SGPT) 44 03/06/2019 01:00 PM    AST (SGOT) 34 03/06/2019 01:00 PM    Alk. phosphatase 119 (H) 03/06/2019 01:00 PM    Protein, total 7.7 03/06/2019 01:00 PM    Albumin 2.6 (L) 03/06/2019 01:00 PM    Globulin 5.1 (H) 03/06/2019 01:00 PM     No results found for: IRON, FE, TIBC, IBCT, PSAT, FERR    Lab Results   Component Value Date/Time    Vitamin B12 1,162 03/05/2019 12:52 PM    Folate <1.5 (L) 03/05/2019 12:52 PM     Lab Results   Component Value Date/Time    TSH 1.210 02/19/2019 05:09 PM    TSH 1.290 09/12/2017 10:33 AM     No results found for: HAMAT, HAAB, HABT, HAAT, HBSAG, HBSB, HBSAT, HBABN, HBCM, HBCAB, HBCAT, Worthy [place]uss[place], HBEAB, [address] [facility], [facility], [id number], [address] [facility] [address], [facility], [address] [place] [address] Drive, [address], [id number], [id number], [address] [facility], [id number], [facility], [id number], HCGAT      Imaging:     Radiology report(s) reviewed. Assessment & Plan:   Kendra Borden is a 37 y.o. 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 1. Eosinophilia: With also some lymphocytes. The marked absolute eosinophils much > 1500 is concerning for hypereosinophilic syndromes such as DRESS, eosinophilic leukemia, systemic mastocytosis, etc but could also be related to infections.  Testing negative for JAK2 mutation, bcr/abl mutation, Strongyloides IgG. Normal serum tryptase, ESR. Evaluation by Dr. Bobby Spencer in Cardiology raises suspicion of Buffy's endocarditis. CXR and abdominal ultrasound normal. No cytopenias present which makes eosinophilic leukemia less likely. However, will obtain bone marrow biopsy. I think most likely patient has an infectious or autoimmune etiology for the eosinophilia. Given marijuana use, she could have contracted a fungal infection. Churg-Jc is also in the differential.  -- Refer to ID for evaluation and to rule out infectious etiologies  -- Agree with Rheumatology evaluation  -- Although I do not have a high suspicion for eosinophilic leukemia, will obtain bone marrow biopsy to rule out primary hematologic etiologies. -- Return in 3 weeks to review results    2. Chest pains: Intermittent for the past 4 weeks. Troponin and CK were elevated on 3/5 and 3/6, but EKG, stress echo negative for ischemia. Dr. 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 -- Cardiac MRI to rule out damage to the heart. 3. Folate deficiency: Possibly due to malabsorption from diarrhea. Started on folic acid in early March. -- Recheck folate in the future. 4. Diarrhea: Subacute and possibly due to infectious or autoimmune etiology etiology. I appreciate the opportunity to participate in Ms. Dania Gilmore's care.     Signed By: Lorne Rivas MD     March 21, 2019

## 2019-03-21 NOTE — PROGRESS NOTES
Patient says her left arm is swollen due to IV being done 3 weeks ago     Visit Vitals  /72 (BP 1 Location: Right arm, BP Patient Position: Sitting)   Pulse (!) 110   Ht 5' 1\" (1.549 m)   Wt 147 lb 3.2 oz (66.8 kg)   SpO2 98%   BMI 27.81 kg/m²

## 2019-03-21 NOTE — PROGRESS NOTES
Kiesha Thomas is a 37 y.o. female here for follow up of leukocytosis. Patient with no complaints of pain at this time.

## 2019-03-22 NOTE — TELEPHONE ENCOUNTER
[facility] and advised ok to schedule per Dr. Polo Funes. She will contact pt to have her call for appointment.  Indu

## 2019-04-02 NOTE — H&P
Interventional Radiology History and Physical (Outpatient)    4/2/2019    Patient: Meenakshi Hemp 37 y.o. female     Referring Physician:  Pema Dodd MD    Chief Complaint: need BM Bx    History of Present Illness: leukocytosis    History:  No past medical history on file. No family history on file. Social History     Socioeconomic History    Marital status: LEGALLY      Spouse name: Not on file    Number of children: Not on file    Years of education: Not on file    Highest education level: Not on file   Occupational History    Not on file   Social Needs    Financial resource strain: Not on file    Food insecurity:     Worry: Not on file     Inability: Not on file    Transportation needs:     Medical: Not on file     Non-medical: Not on file   Tobacco Use    Smoking status: Current Every Day Smoker     Packs/day: 0.50     Years: 20.00     Pack years: 10.00    Smokeless tobacco: Never Used   Substance and Sexual Activity    Alcohol use: No    Drug use: No    Sexual activity: Yes   Lifestyle    Physical activity:     Days per week: Not on file     Minutes per session: Not on file    Stress: Not on file   Relationships    Social connections:     Talks on phone: Not on file     Gets together: Not on file     Attends Bahai service: Not on file     Active member of club or organization: Not on file     Attends meetings of clubs or organizations: Not on file     Relationship status: Not on file    Intimate partner violence:     Fear of current or ex partner: Not on file     Emotionally abused: Not on file     Physically abused: Not on file     Forced sexual activity: Not on file   Other Topics Concern    Not on file   Social History Narrative    Not on file       Allergies: Allergies   Allergen Reactions    Sulfur Itching       Prior to Admission Medications:  Prior to Admission medications    Medication Sig Start Date End Date Taking?  Authorizing Provider   folic acid (FOLVITE) 1 mg tablet Take 1 Tab by mouth daily. 3/13/19  Yes Belen Jarvis, [place]       Physical Exam:    Blood pressure 105/66, pulse (!) 102, resp. rate 28, SpO2 98 %, not currently breastfeeding. General: alert, cooperative, no distress, appears stated age  Heart: rrr  Lungs: clear to auscultation bilaterally  Abdomen: soft  Neuro: grossly intact  Extremities: extremities wnl    Plan of Care/Planned Procedure:  Risks, benefits, and alternatives reviewed with patient and she agrees to proceed with the procedure.      Tevin Long MD

## 2019-04-02 NOTE — DISCHARGE INSTRUCTIONS
Sedation for a Medical Procedure: After Your Visit  Instructions. Fentanyl  Versed    Sedatives are used to relax you for a procedure. You may or may not be awake during the procedure. Common side effects of sedation medications include:                   Drowsiness, dizziness, euphoria, sleepiness or confusion. I              Unsteady gait. Loss of fine muscle control and delayed reaction time. Visual  disturbances, difficulty focusing and blurred vision. Impaired memory recall. Follow-up care is a key component for your health and safety. Be sure to make and go to all medical appointments. Call your doctor if you are having problems. It's also a good idea to keep a list of your allergies, medicines with doses and test results on hand    Home care following your sedation procedure: You may experience some of these side effects or you may not be aware of subtle changes in your behavior or reaction time. Because you received these medications, we are giving you the following instructions: Activity   For your safety, you should not drive until the medicine wears off and you can think clearly and react easily. Do not drive for 24 hours. Rest when you feel tired. Getting enough sleep will help you recover. Diet    You can eat your normal diet. If your stomach is upset, try bland, low-fat foods like plain rice, broiled chicken, toast, and yogurt. Drink plenty of fluids unless your doctor advised you to limit your fluids. Do not consume alcoholic beverages for 24 hours. Instructions  Do not make important personal, business, or legal decisions for 24 hours. Move slowly and carefully, do not make sudden position changes. Be alert for dizziness or lightheadedness and move accordingly. Have a responsible person assist you. Do not drive for 24 hours. Do not operate equipment for 24 hours. Trufa, power tools, kitchen appliances, etc.)    Discharge medications:  Resume prior to test medications as prescribed by your personal physician. If you have any questions or concerns call Radiology RN at [phone]  After hours call Radiology on-call at [phone]    Call [phone] any time you think you may need emergency care. For example:                Call if you passed out (lost consciousness). The medicine is not wearing off and you cannot think clearly. Watch closely for changes in your health, and be sure to contact your doctor if                  you have any problems. Where can you learn more? Go to [employer].be   Enter G817 in the search box to learn more about \"Sedation for a Medical Procedure: After Your Visit. \"       Bone Marrow Aspiration and Biopsy: What to Expect at Home  Your Recovery  The biopsy site may feel sore for several days. It can help to walk, take pain medicine, and put ice packs on the site. You will probably be able to return to work and your usual activities the day after the procedure. Your doctor or nurse will call you with the results of your test.  This care sheet gives you a general idea about how long it will take for you to recover. But each person recovers at a different pace. Follow the steps below to get better as quickly as possible. How can you care for yourself at home? Activity    · Rest when you feel tired. Getting enough sleep will help you recover.     · You may drive when you are no longer taking pain pills and can quickly move your foot from the gas pedal to the brake. You must also be able to sit comfortably for a long period of time, even if you do not plan to go far. You might get caught in traffic.     · Most people are able to return to work the day after the procedure. Medicines    · Your doctor will tell you if and when you can restart your medicines.  He or she will also give you instructions about taking any new medicines.     · If you take blood thinners, such as warfarin (Coumadin), clopidogrel (Plavix), or aspirin, be sure to talk to your doctor. He or she will tell you if and when to start taking those medicines again. Make sure that you understand exactly what your doctor wants you to do.     · Be safe with medicines. Take pain medicines exactly as directed. ? If the doctor gave you a prescription medicine for pain, take it as prescribed. ? If you are not taking a prescription pain medicine, take an over-the-counter medicine such as acetaminophen (Tylenol), ibuprofen (Advil, Motrin), or naproxen (Aleve). Read and follow all instructions on the label. ? Do not take two or more pain medicines at the same time unless the doctor told you to. Many pain medicines have acetaminophen, which is Tylenol. Too much acetaminophen (Tylenol) can be harmful.     · If you think your pain medicine is making you sick to your stomach:  ? Take your medicine after meals (unless your doctor has told you not to). ? Ask your doctor for a different pain medicine.     · If your doctor prescribed antibiotics, take them as directed. Do not stop taking them just because you feel better.    Ice    · Put ice or a cold pack on the biopsy site for 10 to 20 minutes at a time. Put a thin cloth between the ice and your skin. Follow-up care is a key part of your treatment and safety. Be sure to make and go to all appointments, and call your doctor if you are having problems. It's also a good idea to know your test results and keep a list of the medicines you take. When should you call for help? Call 911 anytime you think you may need emergency care. For example, call if:    · You passed out (lost consciousness).    Call your doctor now or seek immediate medical care if:    · You have signs of infection, such as:  ? Increased pain, swelling, warmth, or redness. ? Red streaks leading from the biopsy site.   ? Pus draining from the biopsy site. ? Swollen lymph nodes in your neck, armpits, or groin. ? A fever.    Watch closely for any changes in your health, and be sure to contact your doctor if:    · You are not getting better as expected. Where can you learn more? Go to http://elodia-shannen.info/. Enter E148 in the search box to learn more about \"Bone Marrow Aspiration and Biopsy: What to Expect at Home. \"  Current as of: September 10, 2017  Content Version: 11.8  © 6908-4699 [employer]. Care instructions adapted under license by [employer] (which disclaims liability or warranty for this information). If you have questions about a medical condition or this instruction, always ask your healthcare professional. [address] any warranty or liability for your use of this information. If you have any questions or concerns please call [facility] at [phone] between the hours of 7:00 am and 3:30 pm or after hours call [phone].     DOMINIC Felxi, [facility]

## 2019-04-02 NOTE — PROGRESS NOTES
Pt brought back to Radiology for scheduled CT guided bone marrow biopsy. Pt is accompanied by Any Allred, her mother. Pt is alert and oriented x4.  5997 - RN assessment completed. VS taken (see doc flow) S1S2, Bilateral BS CTA.  4407 - Pt's mother brought back to [facility] to be present during discussion with MD.  Discharge instructions reviewed with Pt and her mother. Opportunity for questions/clarification. Pt able to verbalize understanding. 7783 - Lab called and blood clotted off. Eriberto Owens MD in [facility] discussing procedure and sedation plan with Pt, her mother and RN. 1003 - labs drawn again and sent per order. 1019 - Pt in CT and positioned on table. Procedure started at 1026 hrs and ended at 1046 hrs. Pt received a total of 3 mg of Versed and 75 mcg of Fentanyl over the course of the procedure. Procedure site covered with gauze and a transparent dressing by LORNA Baltazar.  C/D/I  Pt tolerated the procedure well. 1052 - Pt transported back to [facility] on a stretcher. Pt is alert and oriented x4. Pt given fluids and cookies. Pain 0/10.  1119 - Pt given copy of AVS, signed d/c instructions. Denied having any questions. 1133 - Pt on phone, resting, drinking and VS being monitored. No complaints. 1150 - PIV removed and pressure dressing applied. Pt able to ambulate to BR and dress self. Procedure site c/d/i.  1200 - Pt taken in wheel chair to vehicle and mother.

## 2019-04-04 NOTE — TELEPHONE ENCOUNTER
Called patient and informed her that Dr. Magda Desir does have the forms from the [employer] however, she is waiting until patient sees rheumatology to fill out the forms. Patient states her paycheck depends on the forms being filled out and can not wait until until she sees rheumatology. Patient wants a call from Dr. Magda Desir.

## 2019-04-04 NOTE — TELEPHONE ENCOUNTER
Pt called and states that [employer] said they faxed the paperwork to us around March 9th. They say they never got a response from us. Please call Pt on the status of her documentation for short term disability.

## 2019-04-10 NOTE — PROGRESS NOTES
REASON FOR VISIT    This is the initial evaluation for Ms. Makenna Wells a 37 y.o.  female for question of hypereosinophilia. The patient is referred to the [facility] at the request of Dr. Kimberly Bach. HISTORY OF PRESENT ILLNESS     Review of records from : see below    This is a 37 y.o. female with no significant PMHx. She has recently had chest pain intermittently. She was found to have leukocytosis with eosinophilia on blood work. She was seen by oncology, Dr. Shabnam Gallegos. Patient seen in ED in 3/2019 for chest pain and elevated troponin. She was seen by Cardiology Dr. Elen Miranda and work up was reportedly unremarkable. Stress TTE unremarkable for ischemia. 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 This happened suddenly on 1/26/2019 and over the next 2 weeks she could barely walk. Any muscles she  Moved would get fatigued so she went to PCP who did blood work. She is still feeling tired with muscle aches. If she walks more than a block, she has to stop because her muscles hurt and spasms. She denies weakness but has pain in the muscles. She feels soreness in her muscles. She has been having chest pains as well. She right now has pain in right sternum and it just stays there. She also has shortness of breath with exertion and this also started with the fatigue and muscle aches. She has lost 50 lbs over 6 months and it was unintentional.    She also has diarrhea on days she feels extremely tired. IT is watery but not blood in it. She goes multiple times a day on those days. NO sinus issues. No nasal drainage. No asthma history. She feels the more tired her muscles get, the more out of breath she gets. + sore throat usually with chest pain  No difficulty swallowing.    When she gets the tight pain in her abdomen, she sometimes has vomiting. When she first went to the doctor, she had joint pain in hands and fingers. This has now resolved. No joint swelling anywhere. She notes sometimes her fingers are numb. NO problems with getting out of bed, standing up. Raising arms etc.    Lately, her fingers (index finger and thumb) turn blue and it resolves on its own. This does not always happen in cold weather. The fingers are cold when this happens. This started happening recently as well. When she gets out of breath, her tongue goes numb as well. REVIEW OF SYSTEMS    A 15 point review of systems was performed and summarized below. The questionnaire was reviewed with the patient and scanned into the patient's medical record.     General: endorses  recent weight loss 50lbs,fatigue, weakness,denies recent weight gain,  fever, night sweats  Musculoskeletal: endorses muscle pain denies joint pain, joint swelling, morning stiffness ,   Ears: denies ringing in ears, loss of hearing, deafness  Eyes:  denies pain, redness, loss of vision, double vision, blurred vision, dryness, foreign body sensation  Mouth:denies sore tongue, oral ulcers, bleeding gums, loss of taste, dryness, increased dental caries  Nose:  denies nosebleeds, loss of smell, nasal ulcers  Throat: endorses  frequent sore throats denies, hoarseness, difficulty in swallowing, pain in jaw while chewing  Neck:  denies swollen glands, tender glands  Cardiopulmonary: endorses pain in chest denies , irregular heart beat, sudden changes in heart beat, shortness of breath, difficulty breathing at night, dry cough, productive cough, coughing of blood, wheezing  Gastrointestinal: endorses  persistent diarrhea, denies nausea, heartburn, stomach pain relieved by food, vomiting of blood/\"coffee grounds\", jaundice, increasing constipation, blood in stools, black stools  Genitourinary:  denies nocturia, difficult urination, pain or burning on urination, blood in urine, cloudy urine, pus in urine, genital discharge, frequent urination, vaginal dryness, rash/ulcers, sexual difficulties  Hematologic:  denies anemia, bleeding tendency, blood clots  Skin: endorses  color changes of hands or feet in the cold (Raynaud's),denies easy bruising, sun sensitive, rash, redness, hives, skin tightness, nodules/bumps, hair loss, nailbed changes, mechanics hands  Neurologic: endorses  numbness or tingling in hands/feet denies headaches, dizziness, muscle weakness,, memory loss  Psychiatric: denies depression, excessive worries, PTSD, Bipolar  Sleep: endorses  poor sleep (4 hours) difficulty falling asleep, difficulty staying asleep   denies, denies snoring, apnea, daytime somnolence    PAST MEDICAL HISTORY    History reviewed. No pertinent past medical history. Past Surgical History:   Procedure Laterality Date    HX TONSIL AND ADENOIDECTOMY         FAMILY HISTORY    History reviewed. No pertinent family history. SOCIAL HISTORY    Social History     Tobacco Use    Smoking status: Current Every Day Smoker     Packs/day: 0.50     Years: 20.00     Pack years: 10.00    Smokeless tobacco: Never Used   Substance Use Topics    Alcohol use: No    Drug use: No       MEDICATIONS    Current Outpatient Medications   Medication Sig Dispense Refill    folic acid (FOLVITE) 1 mg tablet Take 1 Tab by mouth daily. 30 Tab 0       ALLERGIES    Allergies   Allergen Reactions    Sulfur Itching       PHYSICAL EXAMINATION    Visit Vitals  BP 99/69 (BP 1 Location: Right arm, BP Patient Position: Sitting)   Pulse (!) 119   Temp 98.2 °F (36.8 °C) (Oral)   Resp 16   Ht 5' 1\" (1.549 m)   Wt 146 lb (66.2 kg)   SpO2 98%   BMI 27.59 kg/m²     Body mass index is 27.59 kg/m². General: NAD  HEENT: PERRL, anicteric, non-injected sclerae; oropharynx without ulcers, erythema, or exudate. Moist mucous membranes. No sinus tenderness noted  Lymphatic: No cervical or axillary lymphadenopathy.   Cardiovascular: S1, S2,no R/M/G  Pulmonary: CTA b/l. No wheezes/rales/rhonchi. Abdominal: Soft,NTND, + BS. Skin: No rash, nodules, or periungual changes. Normal capillaroscopy  Neuro: Alert; able to carry normal conversation  5/5 strength in b/l LE and UE proximally and distally  Able to stand up and sit down 10 times without any issues  Normal sensation  Musculoskeletal:   Cervical & Lumbar Spine  Neck and spine have no noted deformities or signs of inflammation. Curvature of cervical, thoracic, and lumbar spine are within normal limits. Wrist, Hand, & Fingers  No bony deformities, inflammation, or tenderness of bony prominences. No anatomical snuff box tenderness; Full ROM in DIP, PIP, MCP, & carpal joints & with supination and pronation. Elbow  No bony deformities, inflammation, or tenderness in olecranon, medial, lateral epicondyle elbow. Full ROM upon flexion and extension. Shoulder  No bony deformities, inflammation, or tenderness in rotator cuff, biceps tendon, or acromioclavicular joint. Full ROM and strength in shoulder upon adduction, abduction, internal and external rotation. Hip  No bony deformities, inflammation, or tenderness in hip joint. Knee  FROM of the knee. NO swelling or warmth noted. No bony deformity noted    Ankle/toes  No bony deformities, inflammation or tenderness      DATA REVIEW    Prior medical records were reviewed and if applicable are summarized as below:    Labs:   4/2/2019: WBC 30.9, Hb 10.1, Plt 427; Eos 21.9  3/2019: , Alk Phos 120, ESR normal, Tryptase normal, Stronglyloides IgG normal, , VANDANA mutation negative, Eos 29  2/2019: CR 0.87; total bili 1.4, Alk Phos 131, ALT 35, AST normal, WBC 27.3, Hb 12.7, Plt 375, Eos 18.8, SUZI negative, .7    Imaging:   US abdomen (3/2019): normal  CXR (3/2019): normal  Stress echo (3/2019): negative stress test  TTE (3/2019): EF 55%, MV thickening.   Severe MR    Pathology:   Bone Marrow Biopsy (4/2019): Results pending    ASSESSMENT AND PLAN    A 37 y.o. female with no PMHx who for the past 2 months has had fatigue, muscle cramps, shortness of breath, chest pain with hypereosinophilia and positive RF. The differential includes hypereosinophilic syndrome, malignancy,  eosinophilic granulomatosis with polyangiitis. The positive RF is likely related to her underlying disorder as well. She is undergoing work up with hematology and cardiology. MRI heart and bone marrow biopsy is pending. If malignancy is ruled out, the patient likely has HES/EGPA. The patient does not have asthma making EGPA less likely. # Hypereosinophilia:  As noted up, work up pending.  - await bone marrow biospy and MRI heart results. - will obtain ANCA, urinalysis, IgE, C3 C4, CH 50 to evaluate for possible EGPA. - once malignancy ruled out, may try steroids for treatment. 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 Summary of plan was provided in the After Visit Summary patient instructions. I also provided education about MyChart setup and utility. Ms. Darlene Rivas has a reminder for a \"due or due soon\" health maintenance. I have asked that she contact her primary care provider for follow-up on this health maintenance.     TODAY'S ORDERS    Orders Placed This Encounter    QUANTIFERON-TB GOLD PLUS    CT CHEST WO CONT    UA/M W/RFLX CULTURE, ROUTINE    ANTI-NEUTROPHIL CYTOPLASMIC AB    CRYOGLOBULIN W/ REFLX MANDA    COMPLEMENT, CH50, TOTAL    COMPLEMENT, C3 & C4    CK    ALDOLASE    IMMUNOGLOBULIN E, QT    CHRONIC HEPATITIS PANEL       Future Appointments   Date Time Provider Andrew Xenia   4/19/2019  2:00 PM Mary Kaiser MD [facility]   5/3/2019  1:40 PM Isis Banda MD [address] [place], [place] [address]on [address], MD    Adult Rheumatology   [facility]  A Part of [facility], [address] [place] [address] Road   Phone [phone]  Fax [phone]

## 2019-04-10 NOTE — PROGRESS NOTES
Chief Complaint   Patient presents with    Joint Pain     1. Have you been to the ER, urgent care clinic since your last visit? Hospitalized since your last visit? No    2. Have you seen or consulted any other health care providers outside of the [address] [place] [address] since your last visit? Include any pap smears or colon screening.  No

## 2019-04-13 NOTE — PROGRESS NOTES
Discussed at length. Need to r/o CAD as she has severe LV dysfunction. She agreed to proceed with LHC and RHC. Will schedule. Start Coreg 3.125 BID and Lisinopril 5mg po daily. Prescription sent to [facility]. CT chest needed to r/o sarcoidosis- Already ordered by Dr. Bennie Huff.   Once cath completed- will need to dispose on severe MR and MV repair strategy in this young lady.

## 2019-04-15 NOTE — TELEPHONE ENCOUNTER
Scheduled patient for L/RHC to r/o CAD Dx LV dysfunction. Spoke to patient, relayed all hospital instructions. Pt had no further questions.       Procedure:  4/24/19 at 10 am

## 2019-04-16 NOTE — PROGRESS NOTES
Spoke with pt concerning L/R heart cath procedure. (Pt [id number]). Instructions given to pt per VO of Dr. Edgar Quintana. Pt NPO after midnight; take all meds with sip of water in AM; have someone available to drive pt to and from procedure; pack a bag in case an overnight stay is warranted. L/RHC scheduled on 4/24/19 at 10am. Pt advised to arrive 2hrs prior to procedure for prep. Labs to be done STAT. All forms faxed. Pt expressed understanding. Opportunities for questions, clarifications, and concerns provided.

## 2019-04-16 NOTE — TELEPHONE ENCOUNTER
Labs ordered for [facility] procedure. Spoke to pt, she will  labs from [address] today.

## 2019-04-18 NOTE — TELEPHONE ENCOUNTER
Message left for patient to return call to office. Upon speaking with [facility] today, bone marrow biopsy results will not be available for several more days. Patient is scheduled for office here tomorrow. Will offer option of changing appointment to next week.